# Patient Record
Sex: FEMALE | Race: WHITE | NOT HISPANIC OR LATINO | Employment: OTHER | URBAN - METROPOLITAN AREA
[De-identification: names, ages, dates, MRNs, and addresses within clinical notes are randomized per-mention and may not be internally consistent; named-entity substitution may affect disease eponyms.]

---

## 2017-01-04 ENCOUNTER — OFFICE VISIT - HEALTHEAST (OUTPATIENT)
Dept: FAMILY MEDICINE | Facility: CLINIC | Age: 67
End: 2017-01-04

## 2017-01-04 DIAGNOSIS — E78.5 HYPERLIPIDEMIA: ICD-10-CM

## 2017-01-04 DIAGNOSIS — E55.9 VITAMIN D DEFICIENCY: ICD-10-CM

## 2017-01-04 DIAGNOSIS — Z13.820 SCREENING FOR OSTEOPOROSIS: ICD-10-CM

## 2017-01-04 DIAGNOSIS — Z00.00 ROUTINE GENERAL MEDICAL EXAMINATION AT A HEALTH CARE FACILITY: ICD-10-CM

## 2017-01-04 LAB
CHOLEST SERPL-MCNC: 251 MG/DL
FASTING STATUS PATIENT QL REPORTED: YES
HDLC SERPL-MCNC: 57 MG/DL
LDLC SERPL CALC-MCNC: 177 MG/DL
TRIGL SERPL-MCNC: 83 MG/DL

## 2017-01-04 ASSESSMENT — MIFFLIN-ST. JEOR: SCORE: 1262.59

## 2017-01-06 ENCOUNTER — RECORDS - HEALTHEAST (OUTPATIENT)
Dept: ADMINISTRATIVE | Facility: OTHER | Age: 67
End: 2017-01-06

## 2017-01-06 ENCOUNTER — RECORDS - HEALTHEAST (OUTPATIENT)
Dept: BONE DENSITY | Facility: CLINIC | Age: 67
End: 2017-01-06

## 2017-01-06 DIAGNOSIS — Z13.820 ENCOUNTER FOR SCREENING FOR OSTEOPOROSIS: ICD-10-CM

## 2017-01-12 ENCOUNTER — COMMUNICATION - HEALTHEAST (OUTPATIENT)
Dept: FAMILY MEDICINE | Facility: CLINIC | Age: 67
End: 2017-01-12

## 2017-05-22 ENCOUNTER — RECORDS - HEALTHEAST (OUTPATIENT)
Dept: ADMINISTRATIVE | Facility: OTHER | Age: 67
End: 2017-05-22

## 2017-06-05 ENCOUNTER — RECORDS - HEALTHEAST (OUTPATIENT)
Dept: ADMINISTRATIVE | Facility: OTHER | Age: 67
End: 2017-06-05

## 2017-07-17 ENCOUNTER — RECORDS - HEALTHEAST (OUTPATIENT)
Dept: ADMINISTRATIVE | Facility: OTHER | Age: 67
End: 2017-07-17

## 2017-12-14 ENCOUNTER — HOSPITAL ENCOUNTER (OUTPATIENT)
Dept: MAMMOGRAPHY | Facility: HOSPITAL | Age: 67
Discharge: HOME | End: 2017-12-14
Attending: FAMILY MEDICINE

## 2017-12-14 DIAGNOSIS — Z12.31 VISIT FOR SCREENING MAMMOGRAM: ICD-10-CM

## 2018-01-31 ENCOUNTER — OFFICE VISIT - HEALTHEAST (OUTPATIENT)
Dept: FAMILY MEDICINE | Facility: CLINIC | Age: 68
End: 2018-01-31

## 2018-01-31 DIAGNOSIS — E55.9 VITAMIN D DEFICIENCY: ICD-10-CM

## 2018-01-31 DIAGNOSIS — M67.431 GANGLION CYST OF WRIST, RIGHT: ICD-10-CM

## 2018-01-31 DIAGNOSIS — E78.2 MIXED HYPERLIPIDEMIA: ICD-10-CM

## 2018-01-31 DIAGNOSIS — Z00.00 WELCOME TO MEDICARE PREVENTIVE VISIT: ICD-10-CM

## 2018-01-31 DIAGNOSIS — Z86.19 H/O COLD SORES: ICD-10-CM

## 2018-01-31 DIAGNOSIS — N31.8 HYPERTONICITY OF BLADDER: ICD-10-CM

## 2018-01-31 LAB
ALBUMIN SERPL-MCNC: 3.8 G/DL (ref 3.5–5)
ALP SERPL-CCNC: 66 U/L (ref 45–120)
ALT SERPL W P-5'-P-CCNC: 36 U/L (ref 0–45)
ANION GAP SERPL CALCULATED.3IONS-SCNC: 11 MMOL/L (ref 5–18)
AST SERPL W P-5'-P-CCNC: 21 U/L (ref 0–40)
BILIRUB SERPL-MCNC: 0.7 MG/DL (ref 0–1)
BUN SERPL-MCNC: 21 MG/DL (ref 8–22)
CALCIUM SERPL-MCNC: 9.2 MG/DL (ref 8.5–10.5)
CHLORIDE BLD-SCNC: 107 MMOL/L (ref 98–107)
CHOLEST SERPL-MCNC: 249 MG/DL
CO2 SERPL-SCNC: 23 MMOL/L (ref 22–31)
CREAT SERPL-MCNC: 0.76 MG/DL (ref 0.6–1.1)
FASTING STATUS PATIENT QL REPORTED: YES
GFR SERPL CREATININE-BSD FRML MDRD: >60 ML/MIN/1.73M2
GLUCOSE BLD-MCNC: 91 MG/DL (ref 70–125)
HDLC SERPL-MCNC: 64 MG/DL
LDLC SERPL CALC-MCNC: 163 MG/DL
POTASSIUM BLD-SCNC: 4.1 MMOL/L (ref 3.5–5)
PROT SERPL-MCNC: 6.9 G/DL (ref 6–8)
SODIUM SERPL-SCNC: 141 MMOL/L (ref 136–145)
TRIGL SERPL-MCNC: 109 MG/DL

## 2018-01-31 ASSESSMENT — MIFFLIN-ST. JEOR: SCORE: 1273.46

## 2018-02-01 LAB
25(OH)D3 SERPL-MCNC: 43.5 NG/ML (ref 30–80)
25(OH)D3 SERPL-MCNC: 43.5 NG/ML (ref 30–80)

## 2018-02-05 ENCOUNTER — RECORDS - HEALTHEAST (OUTPATIENT)
Dept: ADMINISTRATIVE | Facility: OTHER | Age: 68
End: 2018-02-05

## 2018-09-26 ENCOUNTER — RECORDS - HEALTHEAST (OUTPATIENT)
Dept: ADMINISTRATIVE | Facility: OTHER | Age: 68
End: 2018-09-26

## 2018-10-25 ENCOUNTER — RECORDS - HEALTHEAST (OUTPATIENT)
Dept: ADMINISTRATIVE | Facility: OTHER | Age: 68
End: 2018-10-25

## 2019-02-18 ENCOUNTER — HOSPITAL ENCOUNTER (OUTPATIENT)
Dept: MAMMOGRAPHY | Facility: CLINIC | Age: 69
Discharge: HOME | End: 2019-02-18
Attending: FAMILY MEDICINE

## 2019-02-18 DIAGNOSIS — Z12.31 VISIT FOR SCREENING MAMMOGRAM: ICD-10-CM

## 2019-03-22 ENCOUNTER — OFFICE VISIT - HEALTHEAST (OUTPATIENT)
Dept: FAMILY MEDICINE | Facility: CLINIC | Age: 69
End: 2019-03-22

## 2019-03-22 DIAGNOSIS — Z00.00 ROUTINE GENERAL MEDICAL EXAMINATION AT A HEALTH CARE FACILITY: ICD-10-CM

## 2019-03-22 DIAGNOSIS — N31.8 HYPERTONICITY OF BLADDER: ICD-10-CM

## 2019-03-22 DIAGNOSIS — Z86.39 HISTORY OF VITAMIN D DEFICIENCY: ICD-10-CM

## 2019-03-22 DIAGNOSIS — Z86.19 H/O COLD SORES: ICD-10-CM

## 2019-03-22 DIAGNOSIS — E78.2 MIXED HYPERLIPIDEMIA: ICD-10-CM

## 2019-03-22 LAB
ALBUMIN SERPL-MCNC: 4 G/DL (ref 3.5–5)
ALP SERPL-CCNC: 68 U/L (ref 45–120)
ALT SERPL W P-5'-P-CCNC: 19 U/L (ref 0–45)
ANION GAP SERPL CALCULATED.3IONS-SCNC: 7 MMOL/L (ref 5–18)
AST SERPL W P-5'-P-CCNC: 14 U/L (ref 0–40)
BILIRUB SERPL-MCNC: 0.5 MG/DL (ref 0–1)
BUN SERPL-MCNC: 18 MG/DL (ref 8–22)
CALCIUM SERPL-MCNC: 9.6 MG/DL (ref 8.5–10.5)
CHLORIDE BLD-SCNC: 106 MMOL/L (ref 98–107)
CHOLEST SERPL-MCNC: 243 MG/DL
CO2 SERPL-SCNC: 28 MMOL/L (ref 22–31)
CREAT SERPL-MCNC: 0.79 MG/DL (ref 0.6–1.1)
FASTING STATUS PATIENT QL REPORTED: YES
GFR SERPL CREATININE-BSD FRML MDRD: >60 ML/MIN/1.73M2
GLUCOSE BLD-MCNC: 85 MG/DL (ref 70–125)
HDLC SERPL-MCNC: 62 MG/DL
HGB BLD-MCNC: 15.2 G/DL (ref 12–16)
LDLC SERPL CALC-MCNC: 160 MG/DL
POTASSIUM BLD-SCNC: 4.2 MMOL/L (ref 3.5–5)
PROT SERPL-MCNC: 6.7 G/DL (ref 6–8)
SODIUM SERPL-SCNC: 141 MMOL/L (ref 136–145)
TRIGL SERPL-MCNC: 103 MG/DL

## 2019-03-22 ASSESSMENT — MIFFLIN-ST. JEOR: SCORE: 1263.54

## 2019-03-25 ENCOUNTER — COMMUNICATION - HEALTHEAST (OUTPATIENT)
Dept: FAMILY MEDICINE | Facility: CLINIC | Age: 69
End: 2019-03-25

## 2019-04-04 ENCOUNTER — RECORDS - HEALTHEAST (OUTPATIENT)
Dept: ADMINISTRATIVE | Facility: OTHER | Age: 69
End: 2019-04-04

## 2019-05-23 ENCOUNTER — RECORDS - HEALTHEAST (OUTPATIENT)
Dept: ADMINISTRATIVE | Facility: OTHER | Age: 69
End: 2019-05-23

## 2019-10-29 ENCOUNTER — COMMUNICATION - HEALTHEAST (OUTPATIENT)
Dept: FAMILY MEDICINE | Facility: CLINIC | Age: 69
End: 2019-10-29

## 2020-03-02 ENCOUNTER — HOSPITAL ENCOUNTER (OUTPATIENT)
Dept: MAMMOGRAPHY | Facility: CLINIC | Age: 70
Discharge: HOME | End: 2020-03-02
Attending: FAMILY MEDICINE

## 2020-03-02 DIAGNOSIS — Z12.31 VISIT FOR SCREENING MAMMOGRAM: ICD-10-CM

## 2020-03-04 ENCOUNTER — OFFICE VISIT - HEALTHEAST (OUTPATIENT)
Dept: FAMILY MEDICINE | Facility: CLINIC | Age: 70
End: 2020-03-04

## 2020-03-04 DIAGNOSIS — Z00.00 MEDICARE ANNUAL WELLNESS VISIT, SUBSEQUENT: ICD-10-CM

## 2020-03-04 DIAGNOSIS — L82.1 SEBORRHEIC KERATOSIS: ICD-10-CM

## 2020-03-04 DIAGNOSIS — E78.5 HYPERLIPIDEMIA: ICD-10-CM

## 2020-03-04 DIAGNOSIS — Z01.110 ENCOUNTER FOR HEARING EXAMINATION FOLLOWING FAILED HEARING SCREENING: ICD-10-CM

## 2020-03-04 DIAGNOSIS — E66.3 OVERWEIGHT: ICD-10-CM

## 2020-03-04 DIAGNOSIS — Z12.83 SKIN CANCER SCREENING: ICD-10-CM

## 2020-03-04 DIAGNOSIS — N39.3 FEMALE STRESS INCONTINENCE: ICD-10-CM

## 2020-03-04 DIAGNOSIS — Z86.19 H/O COLD SORES: ICD-10-CM

## 2020-03-04 LAB
ALBUMIN SERPL-MCNC: 4 G/DL (ref 3.5–5)
ALP SERPL-CCNC: 76 U/L (ref 45–120)
ALT SERPL W P-5'-P-CCNC: 22 U/L (ref 0–45)
ANION GAP SERPL CALCULATED.3IONS-SCNC: 10 MMOL/L (ref 5–18)
AST SERPL W P-5'-P-CCNC: 19 U/L (ref 0–40)
BILIRUB SERPL-MCNC: 0.8 MG/DL (ref 0–1)
BUN SERPL-MCNC: 17 MG/DL (ref 8–22)
CALCIUM SERPL-MCNC: 9.8 MG/DL (ref 8.5–10.5)
CHLORIDE BLD-SCNC: 105 MMOL/L (ref 98–107)
CHOLEST SERPL-MCNC: 230 MG/DL
CO2 SERPL-SCNC: 27 MMOL/L (ref 22–31)
CREAT SERPL-MCNC: 0.82 MG/DL (ref 0.6–1.1)
FASTING STATUS PATIENT QL REPORTED: YES
GFR SERPL CREATININE-BSD FRML MDRD: >60 ML/MIN/1.73M2
GLUCOSE BLD-MCNC: 86 MG/DL (ref 70–125)
HDLC SERPL-MCNC: 62 MG/DL
HGB BLD-MCNC: 14.5 G/DL (ref 12–16)
LDLC SERPL CALC-MCNC: 154 MG/DL
POTASSIUM BLD-SCNC: 4.2 MMOL/L (ref 3.5–5)
PROT SERPL-MCNC: 6.9 G/DL (ref 6–8)
SODIUM SERPL-SCNC: 142 MMOL/L (ref 136–145)
TRIGL SERPL-MCNC: 70 MG/DL

## 2020-03-04 ASSESSMENT — ANXIETY QUESTIONNAIRES
1. FEELING NERVOUS, ANXIOUS, OR ON EDGE: NOT AT ALL
2. NOT BEING ABLE TO STOP OR CONTROL WORRYING: SEVERAL DAYS

## 2020-03-04 ASSESSMENT — MIFFLIN-ST. JEOR: SCORE: 1249.08

## 2020-03-24 ENCOUNTER — COMMUNICATION - HEALTHEAST (OUTPATIENT)
Dept: AUDIOLOGY | Facility: CLINIC | Age: 70
End: 2020-03-24

## 2020-09-23 ENCOUNTER — RECORDS - HEALTHEAST (OUTPATIENT)
Dept: ADMINISTRATIVE | Facility: OTHER | Age: 70
End: 2020-09-23

## 2020-09-28 ENCOUNTER — RECORDS - HEALTHEAST (OUTPATIENT)
Dept: ADMINISTRATIVE | Facility: OTHER | Age: 70
End: 2020-09-28

## 2021-01-13 ENCOUNTER — COMMUNICATION - HEALTHEAST (OUTPATIENT)
Dept: FAMILY MEDICINE | Facility: CLINIC | Age: 71
End: 2021-01-13

## 2021-01-13 DIAGNOSIS — N31.8 HYPERTONICITY OF BLADDER: ICD-10-CM

## 2021-02-02 ENCOUNTER — RECORDS - HEALTHEAST (OUTPATIENT)
Dept: ADMINISTRATIVE | Facility: OTHER | Age: 71
End: 2021-02-02

## 2021-03-17 ENCOUNTER — HOSPITAL ENCOUNTER (OUTPATIENT)
Dept: MAMMOGRAPHY | Facility: CLINIC | Age: 71
Discharge: HOME | End: 2021-03-17
Attending: FAMILY MEDICINE

## 2021-03-17 DIAGNOSIS — Z12.31 VISIT FOR SCREENING MAMMOGRAM: ICD-10-CM

## 2021-05-10 ENCOUNTER — OFFICE VISIT - HEALTHEAST (OUTPATIENT)
Dept: FAMILY MEDICINE | Facility: CLINIC | Age: 71
End: 2021-05-10

## 2021-05-10 ENCOUNTER — COMMUNICATION - HEALTHEAST (OUTPATIENT)
Dept: FAMILY MEDICINE | Facility: CLINIC | Age: 71
End: 2021-05-10

## 2021-05-10 DIAGNOSIS — N31.8 HYPERTONICITY OF BLADDER: ICD-10-CM

## 2021-05-10 DIAGNOSIS — Z01.10 ENCOUNTER FOR HEARING EXAMINATION, UNSPECIFIED WHETHER ABNORMAL FINDINGS: ICD-10-CM

## 2021-05-10 DIAGNOSIS — Z86.19 H/O COLD SORES: ICD-10-CM

## 2021-05-10 DIAGNOSIS — E55.9 VITAMIN D DEFICIENCY, UNSPECIFIED: ICD-10-CM

## 2021-05-10 DIAGNOSIS — N39.0 ACUTE UTI: ICD-10-CM

## 2021-05-10 DIAGNOSIS — E78.2 MIXED HYPERLIPIDEMIA: ICD-10-CM

## 2021-05-10 DIAGNOSIS — Z12.83 SKIN CANCER SCREENING: ICD-10-CM

## 2021-05-10 DIAGNOSIS — N39.3 FEMALE STRESS INCONTINENCE: ICD-10-CM

## 2021-05-10 DIAGNOSIS — R39.9 UTI SYMPTOMS: ICD-10-CM

## 2021-05-10 DIAGNOSIS — Z00.00 MEDICARE ANNUAL WELLNESS VISIT, SUBSEQUENT: ICD-10-CM

## 2021-05-10 DIAGNOSIS — E66.3 OVERWEIGHT: ICD-10-CM

## 2021-05-10 LAB
ALBUMIN SERPL-MCNC: 4.1 G/DL (ref 3.5–5)
ALBUMIN UR-MCNC: ABNORMAL G/DL
ALP SERPL-CCNC: 75 U/L (ref 45–120)
ALT SERPL W P-5'-P-CCNC: 13 U/L (ref 0–45)
ANION GAP SERPL CALCULATED.3IONS-SCNC: 10 MMOL/L (ref 5–18)
APPEARANCE UR: ABNORMAL
AST SERPL W P-5'-P-CCNC: 13 U/L (ref 0–40)
BACTERIA #/AREA URNS HPF: ABNORMAL /[HPF]
BILIRUB SERPL-MCNC: 0.8 MG/DL (ref 0–1)
BILIRUB UR QL STRIP: ABNORMAL
BUN SERPL-MCNC: 16 MG/DL (ref 8–22)
CALCIUM SERPL-MCNC: 9 MG/DL (ref 8.5–10.5)
CHLORIDE BLD-SCNC: 104 MMOL/L (ref 98–107)
CHOLEST SERPL-MCNC: 246 MG/DL
CO2 SERPL-SCNC: 26 MMOL/L (ref 22–31)
COLOR UR AUTO: YELLOW
CREAT SERPL-MCNC: 0.84 MG/DL (ref 0.6–1.1)
FASTING STATUS PATIENT QL REPORTED: YES
GFR SERPL CREATININE-BSD FRML MDRD: >60 ML/MIN/1.73M2
GLUCOSE BLD-MCNC: 100 MG/DL (ref 70–125)
GLUCOSE UR STRIP-MCNC: NEGATIVE MG/DL
HDLC SERPL-MCNC: 59 MG/DL
HGB BLD-MCNC: 15.1 G/DL (ref 12–16)
HGB UR QL STRIP: ABNORMAL
KETONES UR STRIP-MCNC: NEGATIVE MG/DL
LDLC SERPL CALC-MCNC: 165 MG/DL
LEUKOCYTE ESTERASE UR QL STRIP: ABNORMAL
NITRATE UR QL: NEGATIVE
PH UR STRIP: 7.5 [PH] (ref 5–8)
POTASSIUM BLD-SCNC: 4.4 MMOL/L (ref 3.5–5)
PROT SERPL-MCNC: 7.1 G/DL (ref 6–8)
RBC #/AREA URNS AUTO: ABNORMAL HPF
SODIUM SERPL-SCNC: 140 MMOL/L (ref 136–145)
SP GR UR STRIP: 1.01 (ref 1–1.03)
SQUAMOUS #/AREA URNS AUTO: ABNORMAL LPF
TRIGL SERPL-MCNC: 108 MG/DL
TSH SERPL DL<=0.005 MIU/L-ACNC: 1.68 UIU/ML (ref 0.3–5)
UROBILINOGEN UR STRIP-ACNC: ABNORMAL
WBC #/AREA URNS AUTO: >100 HPF
WBC CLUMPS #/AREA URNS HPF: PRESENT /[HPF]

## 2021-05-10 RX ORDER — ACETAMINOPHEN 325 MG/1
650 TABLET ORAL EVERY 6 HOURS PRN
Status: SHIPPED | COMMUNITY
Start: 2021-05-10

## 2021-05-10 RX ORDER — VALACYCLOVIR HYDROCHLORIDE 500 MG/1
TABLET, FILM COATED ORAL
Qty: 20 TABLET | Refills: 12 | Status: SHIPPED | OUTPATIENT
Start: 2021-05-10 | End: 2022-05-08

## 2021-05-10 ASSESSMENT — MIFFLIN-ST. JEOR: SCORE: 1254.36

## 2021-05-11 LAB
25(OH)D3 SERPL-MCNC: 58.5 NG/ML (ref 30–80)
25(OH)D3 SERPL-MCNC: 58.5 NG/ML (ref 30–80)
BACTERIA SPEC CULT: NORMAL

## 2021-05-17 ENCOUNTER — RECORDS - HEALTHEAST (OUTPATIENT)
Dept: ADMINISTRATIVE | Facility: OTHER | Age: 71
End: 2021-05-17

## 2021-05-24 ENCOUNTER — RECORDS - HEALTHEAST (OUTPATIENT)
Dept: ADMINISTRATIVE | Facility: CLINIC | Age: 71
End: 2021-05-24

## 2021-05-25 ENCOUNTER — RECORDS - HEALTHEAST (OUTPATIENT)
Dept: ADMINISTRATIVE | Facility: CLINIC | Age: 71
End: 2021-05-25

## 2021-05-26 NOTE — PROGRESS NOTES
Assessment and Plan:       Neela was seen today for annual exam.    Routine general medical examination at a health care facility  -     Lipid Cascade FASTING  -     Hemoglobin  -Pneumovax and tetanus booster administered today.  Counseled on vaccine and side effects.  Discussed new shingles vaccine.  She will check with insurance on coverage.  Mammogram up-to-date.  Colonoscopy scheduled for early April.  Bone density scan due in 2022.  Continue regular exercise, healthy diet and adequate calcium and vitamin D intake.  Return in 1 year for next yearly physical.    H/O cold sores  -     valACYclovir (VALTREX) 500 MG tablet; 1000 mg (2 tabs) twice daily x 2 days with outbreak, then 500 mg (1 tab) twice daily for up to 6 more days  -Continue valacyclovir as needed.  Refill provided    Hyperactivity Of The Bladder  -     oxybutynin (DITROPAN) 5 MG tablet; Take 1 tablet (5 mg total) by mouth once daily.  -Continue oxybutynin.  Discussed Kegel exercises.  Avoid caffeine and alcohol as these can contribute to irritability of the bladder    Mixed hyperlipidemia  -     Comprehensive Metabolic Panel   -continue intensive lifestyle changes  -    History of vitamin D deficiency  Continue vitamin D supplement          The patient's current medical problems were reviewed.    The following high BMI interventions were performed this visit: encouragement to exercise  The following health maintenance schedule was reviewed with the patient and provided in printed form in the after visit summary:   Health Maintenance   Topic Date Due     ZOSTER VACCINES (2 of 3) 04/18/2016     PNEUMOCOCCAL POLYSACCHARIDE VACCINE AGE 65 AND OVER  05/31/2017     DXA SCAN  01/06/2019     FALL RISK ASSESSMENT  01/31/2019     TD 18+ HE  08/20/2019     MAMMOGRAM  02/18/2021     ADVANCE DIRECTIVES DISCUSSED WITH PATIENT  01/31/2023     COLONOSCOPY  04/01/2024     INFLUENZA VACCINE RULE BASED  Completed     PNEUMOCOCCAL CONJUGATE VACCINE FOR ADULTS (PCV13  OR PREVNAR)  Completed        Subjective:   Chief Complaint: Neela Bonner is an 68 y.o. female here for an Annual Wellness visit.   HPI: She has not been seen by her physical about 1 year ago.  Reviewed her health history.  No significant changes in her health.  Medications and allergies are reviewed.  Family and social history are reviewed and updated.  She had hysterectomy for benign uterine fibroids.  Still has ovaries.  Has a ganglion cyst on her right wrist.  Saw orthopedic specialist for this last year and had some fluid removed.  However the cyst has reoccurred and does occasionally cause her discomfort, primarily at night.  She is contemplating going back to an orthopedic specialist for this.  She does Pilates and aerobic exercise.  Was having some pain in her hip and had cortisone injections.  Still having some trouble with catching in the left hip and is thinking about returning to the orthopedic specialist for this as well.  She has lost 3 pounds since last visit.  She tries to follow healthy diet.  She has history of vitamin D deficiency and is on a vitamin D supplement.  Has history of hyperlipidemia that is not currently being treated with medications.  She has history of hyperactivity of the bladder.  Takes oxybutynin.  Has history of cold sores and uses valacyclovir as needed.  Gets regular vision and dental exams.  Up-to-date on mammogram.  Has colonoscopy scheduled for early April.  Review of systems assessed and otherwise negative.  No other concerns or questions.    Review of Systems:   Please see above.  The rest of the review of systems are negative for all systems.    Patient Care Team:  Joy Schreiber MD as PCP - General (Family Medicine)     Patient Active Problem List   Diagnosis     Menopause Has Occurred     H/O cold sores     Vitamin D Deficiency     Hyperlipidemia     Overweight     Hyperactivity Of The Bladder     Localized Primary Osteoarthritis Of The Left Hip     Patellar  Chondromalacia     Rotator cuff tear (left)     Benign Adenoma Of The Large Intestine     History of being hospitalized     Cardiac risk counseling     Past Medical History:   Diagnosis Date     Chickenpox 12 y/o     Dermatophytosis of nail 2008, 2009 2008. Recurrence 1/2009. Painful.  Treating with oral lamisil x3 months then recheck. Checked LFTs & Cr (wnl)     Esophageal reflux 8/15/2011    - 8/15/11: c/o chest discomfort, bloating sensation, +epigastric tender, rxd omeprazole 20mg XR/day - 10/2011: took PPI x1 mo, then stopped and symptoms have not returned     Fibrocystic breast      H/O exercise stress test 8/24/11    - 8/24/11 exercise Stress Test, [facility]: neg test, no EKG changes, no inducible ischemia, good exercise tolerance (105% of predicted)      Past Surgical History:   Procedure Laterality Date     COLONOSCOPY  3/10/04    - 3/10/04 [facility], screening colonoscpopy WNL, repeat 10 years     COLONOSCOPY W/ BIOPSIES  4/1/14    - 4/1/14 [facility], [facility], repeat screening colonoscopy: 2mm polyp ileocecal valve (path=hyperplastic polyp); 4mm polyp splenic flexure (path=sessile serrated adenoma, no overt dysplasia); 4mm polyp sigmoid colon (not removed due to pt discomfort); repeat 5 yrs using MAC & propofol     HYSTERECTOMY  1990's     NM REMOVE TONSILS/ADENOIDS,<11 Y/O  1958 (9 y/o)    T&A     NM REVISE MEDIAN N/CARPAL TUNNEL SURG Right 10/26/05    - 10/26/05 Dr Duarte, [facility]: RIGHT carpal tunnel release     NM REVISE MEDIAN N/CARPAL TUNNEL SURG Left 6/2007    - 6/2007 Dr Sullivan, [facility]: L carpal tunnel release     NM SLING OPER STRES INCONTINENCE  7/9/07    - 7/9/07 Dr Chin: TVT for stress incontinence     NM TOTAL ABDOM HYSTERECTOMY  5/1999    - 5/1999: ANNALEE for fibroids, benign, by Dr. Mitchell ([facility]). Cervix taken. Does NOT need paps (per Dionisio 2/2010)      Family History   Problem Relation Age of Onset     Anxiety disorder Daughter          anxiety on both sides of family; anxiety attacks; daughter had been on Effexor     Breast cancer Cousin         40s; maternal cousin     Heart attack Maternal Grandfather      Heart attack Maternal Aunt 75        Mat aunt: MI 74 y/o     Heart attack Maternal Uncle         several mat uncles     Nephrolithiasis Mother      Filipe Parkinson White syndrome Mother         controlled on atenolol     Nephrolithiasis Brother      Obesity Brother         brothers obese     Filipe Parkinson White syndrome Brother         - Brother: attempted ablation (unsuccessful), controlled on atenolol     Breast cancer Cousin         40s; maternal (bone mets)     Dementia Father      Heart disease Father       Social History     Socioeconomic History     Marital status:      Spouse name: Not on file     Number of children: Not on file     Years of education: Not on file     Highest education level: Not on file   Occupational History     Occupation: Homemaker     Comment: since 2007     Occupation: RETIRED from [employer]     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    Social Needs     Financial resource strain: Not on file     Food insecurity:     Worry: Not on file     Inability: Not on file     Transportation needs:     Medical: Not on file     Non-medical: Not on file   Tobacco Use     Smoking status: Never Smoker     Smokeless tobacco: Never Used   Substance and Sexual Activity     Alcohol use: Yes     Alcohol/week: 0.6 oz     Types: 1 Standard drinks or equivalent per week     Comment: social      Drug use: No     Sexual activity: Yes     Partners: Male     Birth control/protection: Post-menopausal, Surgical   Lifestyle     Physical activity:     Days per week: Not on file     Minutes per session: Not on file     Stress: Not on file   Relationships     Social connections:     Talks on phone: Not on file     Gets together: Not on file     Attends Lutheran service: Not on file     Active member of club or organization: Not  "on file     Attends meetings of clubs or organizations: Not on file     Relationship status: Not on file     Intimate partner violence:     Fear of current or ex partner: Not on file     Emotionally abused: Not on file     Physically abused: Not on file     Forced sexual activity: Not on file   Other Topics Concern     Not on file   Social History Narrative    Notes per PCP, Dr Nichole 12/21/2014:         HOME ENVIRONMENT:    - 12/2012: Lives with , no pets, split level house        MARITAL HISTORY:    -  to [place] \"[place] since 1976        EDUCATION:    - high school grad ([place])        HOBBIES/LEISURE ACTIVITIES:    - 10/2009: sewing, wants to get back into exercising, book club        PERSONAL HISTORY:    - From [place], [place], 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 Has 1 sister in [place]; still has family in [place]; parents still in [place]      Current Outpatient Medications   Medication Sig Dispense Refill     cholecalciferol, vitamin D3, (VITAMIN D3) 5,000 unit Tab Take by mouth daily.       ibuprofen (ADVIL,MOTRIN) 200 MG tablet Take 400 mg by mouth as needed for pain.       oxybutynin (DITROPAN) 5 MG tablet Take 1 tablet (5 mg total) by mouth once daily. 90 tablet 3     valACYclovir (VALTREX) 500 MG tablet 1000 mg (2 tabs) twice daily x 2 days with outbreak, then 500 mg (1 tab) twice daily for up to 6 more days 20 tablet 12     No current facility-administered medications for this visit.       Objective:   Vital Signs:   Visit Vitals  /64 (Patient Site: Right Arm, Patient Position: Sitting, Cuff Size: Adult Large)   Pulse (!) 59   Temp 97.8  F (36.6  C) (Oral)   Ht 5' 4.5\" (1.638 m)   Wt 164 lb 6 oz (74.6 kg)   SpO2 98%   BMI 27.78 kg/m         VisionScreening:  No exam data present     PHYSICAL EXAM  Physical Examination: General appearance - alert, well appearing, and in no distress  Mental status - alert, oriented to person, place, and time  Eyes - pupils equal and " reactive, extraocular eye movements intact  Ears - bilateral TM's and external ear canals normal  Nose - normal and patent, no erythema, discharge or polyps  Mouth - mucous membranes moist, pharynx normal without lesions  Neck - supple, no significant adenopathy  Chest - clear to auscultation, no wheezes, rales or rhonchi, symmetric air entry  Heart - normal rate, regular rhythm, normal S1, S2, no murmurs, rubs, clicks or gallops  Abdomen - soft, nontender, nondistended, no masses or organomegaly  Breasts - breasts appear normal, no suspicious masses, no skin or nipple changes or axillary nodes  Extremities - peripheral pulses normal, no pedal edema, no clubbing or cyanosis, ganglion cyst right wrist  Skin - normal coloration and turgor, no rashes, no suspicious skin lesions noted, seborrheic keratoses noted on back and abdomen      Assessment Results 3/22/2019   Activities of Daily Living No help needed   Instrumental Activities of Daily Living No help needed   Mini Cog Total Score 5   Some recent data might be hidden     A Mini-Cog score of 0-2 suggests the possibility of dementia, score of 3-5 suggests no dementia    Identified Health Risks:     Information on urinary incontinence and treatment options given to patient.  Information regarding advance directives (living vasquez), including where she can download the appropriate form, was provided to the patient via the AVS.

## 2021-05-27 VITALS
BODY MASS INDEX: 27.83 KG/M2 | SYSTOLIC BLOOD PRESSURE: 117 MMHG | DIASTOLIC BLOOD PRESSURE: 64 MMHG | HEIGHT: 64 IN | WEIGHT: 163 LBS | TEMPERATURE: 98 F | HEART RATE: 71 BPM | OXYGEN SATURATION: 96 %

## 2021-05-30 VITALS — WEIGHT: 163.06 LBS | BODY MASS INDEX: 27.17 KG/M2 | HEIGHT: 65 IN

## 2021-05-31 VITALS — HEIGHT: 64 IN | BODY MASS INDEX: 28.59 KG/M2 | WEIGHT: 167.44 LBS

## 2021-06-02 ENCOUNTER — RECORDS - HEALTHEAST (OUTPATIENT)
Dept: ADMINISTRATIVE | Facility: OTHER | Age: 71
End: 2021-06-02

## 2021-06-02 ENCOUNTER — RECORDS - HEALTHEAST (OUTPATIENT)
Dept: ADMINISTRATIVE | Facility: CLINIC | Age: 71
End: 2021-06-02

## 2021-06-02 VITALS — BODY MASS INDEX: 27.39 KG/M2 | WEIGHT: 164.38 LBS | HEIGHT: 65 IN

## 2021-06-04 VITALS
WEIGHT: 162.06 LBS | TEMPERATURE: 97.9 F | OXYGEN SATURATION: 98 % | HEART RATE: 61 BPM | HEIGHT: 64 IN | BODY MASS INDEX: 27.67 KG/M2 | SYSTOLIC BLOOD PRESSURE: 118 MMHG | DIASTOLIC BLOOD PRESSURE: 70 MMHG

## 2021-06-05 ENCOUNTER — HEALTH MAINTENANCE LETTER (OUTPATIENT)
Age: 71
End: 2021-06-05

## 2021-06-06 NOTE — PROGRESS NOTES
Assessment and Plan:       Neela was seen today for annual wellness visit.    Medicare annual wellness visit, subsequent  -     Lipid Cascade  -     Comprehensive Metabolic Panel  -     Hemoglobin  -She is up-to-date on vaccines.  Mammogram up-to-date.  Colonoscopy due in 2022.  Bone density scan also due in 2022.  Continue regular exercise, healthy diet and adequate calcium and vitamin D intake.  Check fasting lipids, hemoglobin and CMP.  Return in 1 year for next yearly physical    Hyperlipidemia  -     Lipid Cascade  - Continue intensive lifestyle changes and weight loss efforts.  She has set a goal weight loss of 10 pounds    H/O cold sores  -     valACYclovir (VALTREX) 500 MG tablet; 1000 mg (2 tabs) twice daily x 2 days with outbreak, then 500 mg (1 tab) twice daily for up to 6 more days  -Continue valacyclovir as needed.  Refill provided    Encounter for hearing examination following failed hearing screening  -     Ambulatory referral to Audiology    Female stress incontinence  -     Ambulatory referral to PT/OT    Skin cancer screening  -     Ambulatory referral to Dermatology    Overweight  Continue regular exercise and weight loss efforts    Seborrheic keratosis  Discussed that this is benign in nature.  Did offer treatment with liquid nitrogen but she declines.      The patient's current medical problems were reviewed.    I have had an Advance Directives discussion with the patient.  The following high BMI interventions were performed this visit: encouragement to exercise  The following health maintenance schedule was reviewed with the patient and provided in printed form in the after visit summary:   Health Maintenance   Topic Date Due     ZOSTER VACCINES (3 of 3) 09/13/2019     MEDICARE ANNUAL WELLNESS VISIT  03/04/2021     FALL RISK ASSESSMENT  03/04/2021     DXA SCAN  01/06/2022     MAMMOGRAM  03/02/2022     COLONOSCOPY  04/04/2022     ADVANCE CARE PLANNING  01/31/2023     TD 18+ HE  03/22/2029      HEPATITIS C SCREENING  Completed     PNEUMOCOCCAL IMMUNIZATION 65+ LOW/MEDIUM RISK  Completed     INFLUENZA VACCINE RULE BASED  Completed        Subjective:   Chief Complaint: Neela Bonner is an 69 y.o. female here for an Annual Wellness visit.   HPI: She has not been seen since her last physical about 1 year ago.  Reviewed her health history.  No significant changes in her health.  Recently returned from a [place] vacation with her family.  Did quite a bit of walking and she was pleased that she was not bothered by any hip problems.  She did see her orthopedic specialist last July and received a cortisone injection.  We reviewed and updated medications and allergies as well as family and social history.  She gets regular vision and dental exams.  She exercises by walking and doing Pilates.  Ganglion cyst on right wrist has been unchanged.  She did not follow-up with orthopedic specialist regarding this but is still considering that in the future.  She has lost another couple of pounds since her last physical.  She tries to follow a healthy diet and get adequate calcium intake.  She has history of vitamin D deficiency and remains on a vitamin D supplement.  She has history of hyperlipidemia that is not currently being treated with medications.  She has history of hyperactivity of the bladder as well as stress incontinence.  She takes oxybutynin as needed.  She feels that Pilates exercises has also helped a bit with her incontinence.  She has history of cold sores and uses valacyclovir as needed.  She is up-to-date on mammogram.  She had colonoscopy last April.  3-year follow-up recommended.  She has a spot on her left lower abdomen that she would like checked today.  Seems to have become more raised in appearance.  It is rough in texture but otherwise does not bother her.  She has several moles that she is also concerned about.  She has noticed some changes in her hearing and difficulty hearing in certain  situations.  -Review of systems is assessed and is otherwise negative.  No other concerns or questions today.    Review of Systems:   Please see above.  The rest of the review of systems are negative for all systems.    Patient Care Team:  Joy Schreiber MD as PCP - General (Family Medicine)  Joy Schreiber MD as Assigned PCP     Patient Active Problem List   Diagnosis     Menopause Has Occurred     H/O cold sores     Hyperlipidemia     Overweight     Hyperactivity Of The Bladder     Localized Primary Osteoarthritis Of The Left Hip     Patellar Chondromalacia     Benign Adenoma Of The Large Intestine     History of being hospitalized     Cardiac risk counseling     Past Medical History:   Diagnosis Date     Chickenpox 12 y/o     Dermatophytosis of nail 2008, 2009 2008. Recurrence 1/2009. Painful.  Treating with oral lamisil x3 months then recheck. Checked LFTs & Cr (wnl)     Esophageal reflux 8/15/2011    - 8/15/11: c/o chest discomfort, bloating sensation, +epigastric tender, rxd omeprazole 20mg XR/day - 10/2011: took PPI x1 mo, then stopped and symptoms have not returned     Fibrocystic breast      H/O exercise stress test 8/24/11    - 8/24/11 exercise Stress Test, [facility]: neg test, no EKG changes, no inducible ischemia, good exercise tolerance (105% of predicted)      Past Surgical History:   Procedure Laterality Date     COLONOSCOPY  3/10/04    - 3/10/04 [facility], screening colonoscpopy WNL, repeat 10 years     COLONOSCOPY W/ BIOPSIES  4/1/14    - 4/1/14 [facility], repeat screening colonoscopy: 2mm polyp ileocecal valve (path=hyperplastic polyp); 4mm polyp splenic flexure (path=sessile serrated adenoma, no overt dysplasia); 4mm polyp sigmoid colon (not removed due to pt discomfort); repeat 5 yrs using MAC & propofol     HYSTERECTOMY  1990's     PA REMOVE TONSILS/ADENOIDS,<13 Y/O  1958 (9 y/o)    T&A     [place] REVISE MEDIAN N/CARPAL TUNNEL SURG Right 10/26/05    - 10/26/05 Dr Duarte,  [facility]: RIGHT carpal tunnel release     [facility] REVISE MEDIAN N/CARPAL TUNNEL SURG Left 6/2007    - 6/2007 Dr Sullivan, [facility]: L carpal tunnel release     VA SLING OPER STRES INCONTINENCE  7/9/07    - 7/9/07 Dr Chin: TVT for stress incontinence     VA TOTAL ABDOM HYSTERECTOMY  5/1999    - 5/1999: ANNALEE for fibroids, benign, by Dr. Mitchell ([facility]). Cervix taken. Does NOT need paps (per Dionisio 2/2010)      Family History   Problem Relation Age of Onset     Anxiety disorder Daughter         anxiety on both sides of family; anxiety attacks; daughter had been on Effexor     Breast cancer Cousin         40s; maternal cousin     Heart attack Maternal Grandfather      Heart attack Maternal Aunt 75        Mat aunt: MI 74 y/o     Heart attack Maternal Uncle         several mat uncles     Nephrolithiasis Mother      Filipe Parkinson White syndrome Mother         controlled on atenolol     Nephrolithiasis Brother      Obesity Brother         brothers obese     Filipe Parkinson White syndrome Brother         - Brother: attempted ablation (unsuccessful), controlled on atenolol     Breast cancer Cousin         40s; maternal (bone mets)     Dementia Father      Heart disease Father       Social History     Socioeconomic History     Marital status:      Spouse name: Not on file     Number of children: Not on file     Years of education: Not on file     Highest education level: Not on file   Occupational History     Occupation: Homemaker     Comment: since 2007     Occupation: RETIRED from [employer]     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    Social Needs     Financial resource strain: Not on file     Food insecurity:     Worry: Not on file     Inability: Not on file     Transportation needs:     Medical: Not on file     Non-medical: Not on file   Tobacco Use     Smoking status: Never Smoker     Smokeless tobacco: Never Used   Substance and Sexual Activity     Alcohol use: Yes      "Alcohol/week: 1.0 standard drinks     Types: 1 Standard drinks or equivalent per week     Comment: social      Drug use: No     Sexual activity: Yes     Partners: Male     Birth control/protection: Post-menopausal, Surgical   Lifestyle     Physical activity:     Days per week: Not on file     Minutes per session: Not on file     Stress: Not on file   Relationships     Social connections:     Talks on phone: Not on file     Gets together: Not on file     Attends Jainism service: Not on file     Active member of club or organization: Not on file     Attends meetings of clubs or organizations: Not on file     Relationship status: Not on file     Intimate partner violence:     Fear of current or ex partner: Not on file     Emotionally abused: Not on file     Physically abused: Not on file     Forced sexual activity: Not on file   Other Topics Concern     Not on file   Social History Narrative    Notes per PCP, Dr Nichole 12/21/2014:         HOME ENVIRONMENT:    - 12/2012: Lives with , no pets, split level house        MARITAL HISTORY:    -  to [place] since 1976        EDUCATION:    - high school grad ([place])        HOBBIES/LEISURE ACTIVITIES:    - 10/2009: sewing, wants to get back into exercising, book club        PERSONAL HISTORY:    - From [place], [place], 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 Has 1 sister in [place]; still has family in [place]; parents still in [place]      Current Outpatient Medications   Medication Sig Dispense Refill     cholecalciferol, vitamin D3, (VITAMIN D3) 5,000 unit Tab Take by mouth daily.       ibuprofen (ADVIL,MOTRIN) 200 MG tablet Take 400 mg by mouth as needed for pain.       oxybutynin (DITROPAN) 5 MG tablet Take 1 tablet (5 mg total) by mouth once daily. 90 tablet 3     valACYclovir (VALTREX) 500 MG tablet 1000 mg (2 tabs) twice daily x 2 days with outbreak, then 500 mg (1 tab) twice daily for up to 6 more days 20 tablet 12     No current " "facility-administered medications for this visit.       Objective:   Vital Signs:   Visit Vitals  /70 (Patient Site: Left Arm, Patient Position: Sitting, Cuff Size: Adult Regular)   Pulse 61   Temp 97.9  F (36.6  C) (Oral)   Ht 5' 4.25\" (1.632 m)   Wt 162 lb 1 oz (73.5 kg)   SpO2 98%   BMI 27.60 kg/m         VisionScreening:  No exam data present     PHYSICAL EXAM  Physical Examination: General appearance - alert, well appearing, and in no distress  Mental status - alert, oriented to person, place, and time  Eyes - pupils equal and reactive, extraocular eye movements intact  Ears - bilateral TM's and external ear canals normal  Nose - normal and patent, no erythema, discharge or polyps  Mouth - mucous membranes moist, pharynx normal without lesions  Neck - supple, no significant adenopathy  Lymphatics - no palpable lymphadenopathy, no hepatosplenomegaly  Chest - clear to auscultation, no wheezes, rales or rhonchi, symmetric air entry  Heart - normal rate, regular rhythm, normal S1, S2, no murmurs, rubs, clicks or gallops  Abdomen - soft, nontender, nondistended, no masses or organomegaly  Breasts - breasts appear normal, no suspicious masses, no skin or nipple changes or axillary nodes  Neurological - alert, oriented, normal speech, no focal findings or movement disorder noted  Musculoskeletal - no joint tenderness, deformity or swelling  Extremities - peripheral pulses normal, no pedal edema, no clubbing or cyanosis  Skin - seborrheic keratosis present left lower abdomen      Assessment Results 3/4/2020   Activities of Daily Living No help needed   Instrumental Activities of Daily Living No help needed   Mini Cog Total Score 5   Some recent data might be hidden     A Mini-Cog score of 0-2 suggests the possibility of dementia, score of 3-5 suggests no dementia    Identified Health Risks:     The patient was provided with written information regarding signs of hearing loss.  Information regarding advance " directives (living vasquez), including where she can download the appropriate form, was provided to the patient via the AVS.

## 2021-06-07 NOTE — TELEPHONE ENCOUNTER
Spoke with patient to cancel appointment with audiology for hearing testing on 4-1-20, due to risks associated with COVID-19. Patient will be called to reschedule in the future. Please call [phone] with any questions.

## 2021-06-08 NOTE — PROGRESS NOTES
Assessment:        1. Routine general medical examination at a health care facility     2. Screening for osteoporosis  DXA Bone Density Scan   3. Hyperlipidemia  Lipid Cascade FASTING    Thyroid Stimulating Hormone (TSH)    Comprehensive Metabolic Panel   4. Vitamin D deficiency  Vitamin D, Total (25-Hydroxy)       Plan:      1. Healthcare maintenance: Mammogram and colonoscopy up-to-date.  Does not need Pap smears.  Vaccines up-to-date.  Encouraged regular exercise, healthy diet and adequate calcium and vitamin D intake.  We will check lipids and fasting glucose today.  2. Osteoporosis screening: She is due for DEXA scan.  Order placed.  3. Hyperlipidemia: History of elevated cholesterol levels but has not wanted to take medication for treatment.  Check lipid cascade today along with TSH and comprehensive metabolic panel.  Discussed that if LDL is still above 190, I would recommend starting a statin medication.  She was comfortable with that plan.  Discussed we can continue this for 6-9 months and then discontinue the medication and see if she is able to maintain the lower cholesterol with lifestyle changes.  4. Vitamin D deficiency: Check vitamin D level today.  She is currently taking 5000 IUs of vitamin D daily.          Subjective:      Neela Bonner is a 66 y.o. female who presents for an annual exam.  She was seen recently for concern about a ganglion cyst of the right wrist as well as right shoulder pain.  She reports that both of these issues are about the same.  She has started doing some exercises for the shoulder.  She is not wanting to pursue any further intervention at this point.  Wishes to give both the wrist and shoulder a little bit more time.  We again reviewed her health history including allergies, medications and family history.  No significant changes since her office visit 1 month ago to establish care.  She did have a mammogram in December and that was normal.  No longer needs Pap  smears as she had hysterectomy for benign uterine fibroids.  She still has both ovaries.  She'll be due for follow-up colonoscopy in 2019.  Vaccines are up-to-date.  She does exercise regularly.  Tries to eat a healthy diet and get calcium in her diet.  Review of systems is otherwise negative.    Healthy Habits:   Regular Exercise: Yes --treadmill, pilates  Sunscreen Use: Yes  Healthy Diet: Yes  Dental Visits Regularly: Yes  Seat Belt: Yes  Sexually active: Yes  Self Breast Exam Monthly:Yes  Hemoccults: No  Flex Sig: No  Colonoscopy: Yes  Lipid Profile: Yes  Glucose Screen: Yes  Prevention of Osteoporosis: Yes  Last Dexa:   Guns at Home:  N/A      Immunization History   Administered Date(s) Administered     Influenza, inj, historic 2007, 2008, 12/15/2009, 2010, 10/20/2011, 2013, 2014, 2016, 10/29/2016     Influenza, seasonal,quad inj 6-35 mos 10/13/2012     Td, historic 10/24/2005     Tdap 2009     ZOSTER 2016     Immunization status: up to date and documented.      Gynecologic History  No LMP recorded. Patient has had a hysterectomy.  Contraception: status post hysterectomy  Last mammogram: . Results were: normal      OB History    Para Term  AB SAB TAB Ectopic Multiple Living   2 2 2 0 0 0 0 0 0 2      # Outcome Date GA Lbr Javier/2nd Weight Sex Delivery Anes PTL Lv   2 Term      Vag-Spont   Y   1 Term      Vag-Spont   Y      Obstetric Comments       FT x2       Current Outpatient Prescriptions   Medication Sig Dispense Refill     cholecalciferol, vitamin D3, (VITAMIN D3) 5,000 unit Tab Take by mouth daily.       GLUC ALAN/CHONDRO ALAN A/VIT C/MN (GLUCOSAMINE 1500 COMPLEX ORAL) Take 1 tablet by mouth daily.       ibuprofen (ADVIL,MOTRIN) 200 MG tablet Take 400 mg by mouth as needed for pain.       oxybutynin (DITROPAN) 5 MG tablet Take 1 tablet (5 mg total) by mouth once daily. 90 tablet 3     valACYclovir (VALTREX) 500 MG tablet 1000 mg (2  tabs) twice daily x 2 days with outbreak, then 500 mg (1 tab) twice daily for up to 6 more days 20 tablet 12     No current facility-administered medications for this visit.      Past Medical History   Diagnosis Date     Chickenpox 12 y/o     Dermatophytosis of nail 2008, 2009 2008. Recurrence 1/2009. Painful.  Treating with oral lamisil x3 months then recheck. Checked LFTs & Cr (wnl)     Esophageal reflux 8/15/2011     - 8/15/11: c/o chest discomfort, bloating sensation, +epigastric tender, rxd omeprazole 20mg XR/day - 10/2011: took PPI x1 mo, then stopped and symptoms have not returned     Fibrocystic breast      H/O exercise stress test 8/24/11     - 8/24/11 exercise Stress Test, [facility] [place]: neg test, no EKG changes, no inducible ischemia, good exercise tolerance (105% of predicted)     Past Surgical History   Procedure Laterality Date     Pr remove tonsils/adenoids,<13 y/o  1958 (7 y/o)     T&A     Pr total abdom hysterectomy  5/1999     - 5/1999: ANNALEE for fibroids, benign, by Dr. Mitchell ([facility]). Cervix taken. Does NOT need paps (per Dionisio 2/2010)     Colonoscopy  3/10/04     - 3/10/04 [facility], screening colonoscpopy WNL, repeat 10 years     Pr revise median n/carpal tunnel surg Right 10/26/05     - 10/26/05 Dr Duarte, [facility]: RIGHT carpal tunnel release     Pr revise median n/carpal tunnel surg Left 6/2007     - 6/2007 Dr Sullivan, [facility]: L carpal tunnel release     Pr sling oper stres incontinence  7/9/07     - 7/9/07 Dr Chin: TVT for stress incontinence     Colonoscopy w/ biopsies  4/1/14     - 4/1/14 [facility], repeat screening colonoscopy: 2mm polyp ileocecal valve (path=hyperplastic polyp); 4mm polyp splenic flexure (path=sessile serrated adenoma, no overt dysplasia); 4mm polyp sigmoid colon (not removed due to pt discomfort); repeat 5 yrs using MAC & propofol     Hysterectomy  1990's     Erythromycin base  Family History   Problem  "Relation Age of Onset     Anxiety disorder Daughter      anxiety on both sides of family; anxiety attacks; daughter had been on Effexor     Breast cancer Cousin      40s; maternal cousin     Heart attack Maternal Grandfather      Heart attack Maternal Aunt 75     Mat aunt: MI 74 y/o     Heart attack Maternal Uncle      several mat uncles     Nephrolithiasis Mother      Filipe Parkinson White syndrome Mother      controlled on atenolol     Nephrolithiasis Brother      Obesity Brother      brothers obese     Filipe Parkinson White syndrome Brother      - Brother: attempted ablation (unsuccessful), controlled on atenolol     Breast cancer Cousin      40s; maternal (bone mets)     Dementia Father      Heart disease Father      Social History     Social History     Marital status:      Spouse name: N/A     Number of children: N/A     Years of education: N/A     Occupational History     Homemaker      since 2007     RETIRED from       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      Social History Main Topics     Smoking status: Never Smoker     Smokeless tobacco: Never Used     Alcohol use 0.6 oz/week     1 Standard drinks or equivalent per week     Drug use: No     Sexual activity: Yes     Partners: Male     Birth control/ protection: Post-menopausal, Surgical     Other Topics Concern     Not on file     Social History Narrative    Notes per PCP, Dr Nichole 12/21/2014:         HOME ENVIRONMENT:    - 12/2012: Lives with , no pets, split level house        MARITAL HISTORY:    -  to [place] [facility] since 1976        EDUCATION:    - high school grad ([place])        HOBBIES/LEISURE ACTIVITIES:    - 10/2009: sewing, wants to get back into exercising, book club        PERSONAL HISTORY:    - From [place], [place], 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 Has 1 sister in [place]; still has family in [place]; parents still in [place]       Review of Systems  General:  Denies problem  Eyes: Denies " "problem  Ears/Nose/Throat: Denies problem  Cardiovascular: Denies problem  Respiratory:  Denies problem  Gastrointestinal:  Denies problem, Genitourinary: Denies problem  Musculoskeletal:  Denies problem  Skin: Denies problem  Neurologic: Denies problem  Psychiatric: Denies problem  Endocrine: Denies problem  Heme/Lymphatic: Denies problem   Allergic/Immunologic: Denies problem        Objective:         Visit Vitals     /66 (Patient Site: Left Arm, Patient Position: Sitting, Cuff Size: Adult Large)     Pulse 75     Temp 96.9  F (36.1  C) (Tympanic)     Ht 5' 4.5\" (1.638 m)     Wt 163 lb 1 oz (74 kg)     SpO2 95%     BMI 27.56 kg/m2         Physical Exam:  General Appearance: Alert, cooperative, no distress, appears stated age  Head: Normocephalic, without obvious abnormality, atraumatic  Eyes: PERRL, conjunctiva/corneas clear, EOM's intact  Ears: Normal TM's and external ear canals, both ears  Nose: Nares normal, septum midline,mucosa normal, no drainage  Throat: Lips, mucosa, and tongue normal; teeth and gums normal  Neck: Supple, symmetrical, trachea midline, no adenopathy;  thyroid: not enlarged, symmetric, no tenderness/mass/nodules  Back: Symmetric, no curvature, ROM normal  Lungs: Clear to auscultation bilaterally, respirations unlabored  Breasts: No breast masses, tenderness, asymmetry, or nipple discharge.  Heart: Regular rate and rhythm, S1 and S2 normal, no murmur, rub, or gallop, Abdomen: Soft, non-tender, bowel sounds active all four quadrants,  no masses, no organomegaly  Pelvic:Normally developed genitalia with no external lesions or eruptions. Vagina and cervix show no lesions, inflammation, discharge or tenderness. No cystocele, No rectocele. Uterus absent.  No adnexal mass or tenderness.  Extremities: Extremities normal, atraumatic, no cyanosis or edema  Skin: Skin color, texture, turgor normal, no rashes or lesions  Neurologic: Normal        "

## 2021-06-14 NOTE — TELEPHONE ENCOUNTER
RN cannot approve Refill Request    RN can NOT refill this medication med is not covered by policy/route to provider. Last office visit: 12/7/2016 Joy Schreiber MD Last Physical: 3/4/2020 Last MTM visit: Visit date not found Last visit same specialty: 12/7/2016 Joy Schreiber MD.  Next visit within 3 mo: Visit date not found  Next physical within 3 mo: Visit date not found      Clarice Daily, [facility] Triage/Med Refill 1/13/2021    Requested Prescriptions   Pending Prescriptions Disp Refills     oxybutynin (DITROPAN) 5 MG tablet [Pharmacy Med Name: OXYBUTYNIN 5 MG TABLET] 90 tablet 3     Sig: TAKE 1 TABLET BY MOUTH EVERY DAY       There is no refill protocol information for this order

## 2021-06-15 NOTE — PROGRESS NOTES
Assessment and Plan:       Neela was seen today for welcome to medicare visit.    Welcome to Medicare preventive visit  -     Vision Screening  -     Lipid Cascade  -Check fasting glucose today.  -Prevnar 13 administered.  Counseled on vaccine and side effects.  -Up-to-date on mammogram, colonoscopy and bone density scan  -Advanced directive discussion done today.  She was given copy of honoring choices for herself and her     H/O cold sores  -     valACYclovir (VALTREX) 500 MG tablet; 1000 mg (2 tabs) twice daily x 2 days with outbreak, then 500 mg (1 tab) twice daily for up to 6 more days  -Continue valacyclovir as needed.  Refill provided.    Hyperactivity Of The Bladder  -     oxybutynin (DITROPAN) 5 MG tablet; Take 1 tablet (5 mg total) by mouth once daily.  -Continue oxybutynin    Mixed hyperlipidemia  -     Comprehensive Metabolic Panel  -Continue efforts at weight loss, healthy diet and regular exercise    Vitamin D deficiency  -     Vitamin D, Total (25-Hydroxy)  -Continue vitamin D supplement.  Currently taking 5000 IUs daily    Ganglion cyst of wrist, right  -     Ambulatory referral to Orthopedic Surgery  -Referral placed to orthopedic specialist    Other orders  -     Pneumococcal conjugate vaccine 13-valent 6wks-17yrs; >50yrs        The patient's current medical problems were reviewed.    I have had an Advance Directives discussion with the patient.  The following health maintenance schedule was reviewed with the patient and provided in printed form in the after visit summary:   Health Maintenance   Topic Date Due     PNEUMOCOCCAL POLYSACCHARIDE VACCINE AGE 65 AND OVER  05/31/2017     INFLUENZA VACCINE RULE BASED (1) 08/01/2017     DXA SCAN  01/06/2019     FALL RISK ASSESSMENT  01/31/2019     TD 18+ HE  08/20/2019     MAMMOGRAM  12/14/2019     ADVANCE DIRECTIVES DISCUSSED WITH PATIENT  02/22/2021     COLONOSCOPY  04/01/2024     TDAP ADULT ONE TIME DOSE  Completed     PNEUMOCOCCAL CONJUGATE  VACCINE FOR ADULTS (PCV13 OR PREVNAR)  Completed     ZOSTER VACCINE  Completed        Subjective:   Chief Complaint: Neela Bonner is an 67 y.o. female here for a Welcome to Medicare visit.   HPI: She has not been seen since her physical about 1 year ago.  Health history is reviewed.  Medications and allergies are reviewed.  Family and social history is reviewed and updated.  She had hysterectomy for benign uterine fibroids and does not need Pap smears.  Still has both ovaries.  She continues to have concern about a bump on her right wrist that we have previously evaluated for her.  This has been thought to be a ganglion cyst.  She reports that it seems to be getting a little bit bigger and is bothering her more and she is interested in seeing an orthopedic specialist for this.  She does yoga and Pilates.  She was having some pain in both of her shoulders but that is better following physical therapy and cortisone injections.  She also had a right knee injury last summer and had a cortisone injection from the orthopedic specialist.  However she has not gotten back to a regular weightbearing exercise regimen since this injury and she has gained a few pounds.  She has noticed this as her close feel a little bit tighter.  She tries to eat healthy.  Does not get a lot of milk or other foods that are high in calcium in her diet.  She has history of vitamin D deficiency and is on a vitamin D supplement daily.  She has history of hyperlipidemia that is not currently being treated with lipid-lowering medications.  She also has history of hyperactivity of the bladder.  Takes oxybutynin.  She also has history of cold sores and uses valacyclovir as needed for outbreaks.  Gets regular vision and dental exams.  No other concerns or questions.  Review of systems is otherwise negative.    Review of Systems:   Please see above.  The rest of the review of systems are negative for all systems.    Patient Care Team:  Joy Schreiber  MD as PCP - General (Family Medicine)     Patient Active Problem List   Diagnosis     Menopause Has Occurred     H/O cold sores     Vitamin D Deficiency     Hyperlipidemia     Overweight     Hyperactivity Of The Bladder     Localized Primary Osteoarthritis Of The Left Hip     Patellar Chondromalacia     Rotator cuff tear (left)     Benign Adenoma Of The Large Intestine     History of being hospitalized     Cardiac risk counseling     Past Medical History:   Diagnosis Date     Chickenpox 12 y/o     Dermatophytosis of nail 2008, 2009 2008. Recurrence 1/2009. Painful.  Treating with oral lamisil x3 months then recheck. Checked LFTs & Cr (wnl)     Esophageal reflux 8/15/2011    - 8/15/11: c/o chest discomfort, bloating sensation, +epigastric tender, rxd omeprazole 20mg XR/day - 10/2011: took PPI x1 mo, then stopped and symptoms have not returned     Fibrocystic breast      H/O exercise stress test 8/24/11    - 8/24/11 exercise Stress Test, [facility]: neg test, no EKG changes, no inducible ischemia, good exercise tolerance (105% of predicted)      Past Surgical History:   Procedure Laterality Date     COLONOSCOPY  3/10/04    - 3/10/04 [facility], screening colonoscpopy WNL, repeat 10 years     COLONOSCOPY W/ BIOPSIES  4/1/14    - 4/1/14 [facility], repeat screening colonoscopy: 2mm polyp ileocecal valve (path=hyperplastic polyp); 4mm polyp splenic flexure (path=sessile serrated adenoma, no overt dysplasia); 4mm polyp sigmoid colon (not removed due to pt discomfort); repeat 5 yrs using MAC & propofol     HYSTERECTOMY  1990's     NM REMOVE TONSILS/ADENOIDS,<13 Y/O  1958 (7 y/o)    T&A     NM REVISE MEDIAN N/CARPAL TUNNEL SURG Right 10/26/05    - 10/26/05 Dr Duarte, [facility]: RIGHT carpal tunnel release     NM REVISE MEDIAN N/CARPAL TUNNEL SURG Left 6/2007    - 6/2007 Dr Sullivan, [facility]: L carpal tunnel release     NM SLING OPER STRES INCONTINENCE  7/9/07    - 7/9/07   "Elsy: TVT for stress incontinence     [place] TOTAL ABDOM HYSTERECTOMY  5/1999    - 5/1999: ANNALEE for fibroids, benign, by Dr. Mitchell ([facility]). Cervix taken. Does NOT need paps (per Dionisio 2/2010)      Family History   Problem Relation Age of Onset     Anxiety disorder Daughter      anxiety on both sides of family; anxiety attacks; daughter had been on Effexor     Breast cancer Cousin      40s; maternal cousin     Heart attack Maternal Grandfather      Heart attack Maternal Aunt 75     Mat aunt: MI 74 y/o     Heart attack Maternal Uncle      several mat uncles     Nephrolithiasis Mother      Filipe Parkinson White syndrome Mother      controlled on atenolol     Nephrolithiasis Brother      Obesity Brother      brothers obese     Filipe Parkinson White syndrome Brother      - Brother: attempted ablation (unsuccessful), controlled on atenolol     Breast cancer Cousin      40s; maternal (bone mets)     Dementia Father      Heart disease Father       Social History     Social History     Marital status:      Spouse name: N/A     Number of children: N/A     Years of education: N/A     Occupational History     Homemaker      since 2007     RETIRED from [employer]      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      Social History Main Topics     Smoking status: Never Smoker     Smokeless tobacco: Never Used     Alcohol use 0.6 oz/week     1 Standard drinks or equivalent per week     Drug use: No     Sexual activity: Yes     Partners: Male     Birth control/ protection: Post-menopausal, Surgical     Other Topics Concern     Not on file     Social History Narrative    Notes per PCP, Dr Nichole 12/21/2014:         HOME ENVIRONMENT:    - 12/2012: Lives with , no pets, split level house        MARITAL HISTORY:    -  to [place] [facility] since 1976        EDUCATION:    - high school grad ([place])        HOBBIES/LEISURE ACTIVITIES:    - 10/2009: sewing, wants to get back into exercising, book club        PERSONAL " "HISTORY:    - From [place], [place], 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 Has 1 sister in [place]; still has family in [place]; parents still in [place]       Current Outpatient Prescriptions   Medication Sig Dispense Refill     cholecalciferol, vitamin D3, (VITAMIN D3) 5,000 unit Tab Take by mouth daily.       ibuprofen (ADVIL,MOTRIN) 200 MG tablet Take 400 mg by mouth as needed for pain.       oxybutynin (DITROPAN) 5 MG tablet Take 1 tablet (5 mg total) by mouth once daily. 90 tablet 3     valACYclovir (VALTREX) 500 MG tablet 1000 mg (2 tabs) twice daily x 2 days with outbreak, then 500 mg (1 tab) twice daily for up to 6 more days 20 tablet 12     No current facility-administered medications for this visit.       Objective:   Vital Signs:   Visit Vitals     /74 (Patient Site: Right Arm, Patient Position: Sitting, Cuff Size: Adult Regular)     Pulse 79     Temp 97.5  F (36.4  C) (Tympanic)     Ht 5' 4.25\" (1.632 m)     Wt 167 lb 7 oz (75.9 kg)     SpO2 96%     BMI 28.52 kg/m2        EKG:  Not performed    VisionScreening:   Visual Acuity Screening    Right eye Left eye Both eyes   Without correction:      With correction: 20/20 20/20 20/20        PHYSICAL EXAM  Physical Examination: General appearance - alert, well appearing, and in no distress  Mental status - alert, oriented to person, place, and time  Eyes - pupils equal and reactive, extraocular eye movements intact  Ears - bilateral TM's and external ear canals normal  Nose - normal and patent, no erythema, discharge or polyps  Mouth - mucous membranes moist, pharynx normal without lesions  Neck - supple, no significant adenopathy  Lymphatics - no palpable lymphadenopathy, no hepatosplenomegaly  Chest - clear to auscultation, no wheezes, rales or rhonchi, symmetric air entry  Heart - normal rate, regular rhythm, normal S1, S2, no murmurs, rubs, clicks or gallops  Abdomen - soft, nontender, nondistended, no masses or " organomegaly  Breasts - breasts appear normal, no suspicious masses, no skin or nipple changes or axillary nodes  Pelvic - examination not indicated  Neurological - alert, oriented, normal speech, no focal findings or movement disorder noted  Musculoskeletal - ganglion cyst present dorsum of right hand/wrist  Extremities - peripheral pulses normal, no pedal edema, no clubbing or cyanosis  Skin - normal coloration and turgor, no rashes, no suspicious skin lesions noted      Assessment Results 1/31/2018   Activities of Daily Living No help needed   Instrumental Activities of Daily Living No help needed   Some recent data might be hidden     A Mini Cog score of 0-2 suggests the possibility of dementia, score of 3-5 suggests no dementia    Identified Health Risks:     The patient reports that she drinks more than one alcoholic drink per day but denies binge or excessive drinking. She was counseled and given information about possible harmful effects of excessive alcohol intake.  Information regarding advance directives (living [place]), including where she can download the appropriate form, was provided to the patient via the AVS.

## 2021-06-17 NOTE — PROGRESS NOTES
Assessment and Plan:     Patient has been advised of split billing requirements and indicates understanding: Yes  Neela was seen today for annual exam.    Medicare annual wellness visit, subsequent  -     Hemoglobin  -She is up-to-date on vaccines.  Mammogram up-to-date.  Colonoscopy due in 2022.  Bone density scan also due in 2022.  Continue with regular exercise, healthy diet and adequate calcium and vitamin D intake.  We will check fasting lipids, hemoglobin and CMP.  Return to clinic in 1 year for next physical    Hyperlipidemia  -     Lipid Cascade  -     Comprehensive Metabolic Panel  -     Thyroid Stimulating Hormone (TSH)-continue with healthy diet, regular exercise and weight loss efforts    H/O cold sores  -     valACYclovir (VALTREX) 500 MG tablet; 1000 mg (2 tabs) twice daily x 2 days with outbreak, then 500 mg (1 tab) twice daily for up to 6 more days  -Continue valacyclovir as needed    Hyperactivity Of The Bladder  Continue oxybutynin    Overweight  -     Thyroid Stimulating Hormone (TSH)  -Check TSH and lipids.  Encouraged her to continue with regular exercise and healthy diet and weight loss efforts    UTI symptoms  -     Urinalysis-UC if Indicated    Skin cancer screening  -     Ambulatory referral to Dermatology - [facility] [place]    Female stress incontinence  -     Ambulatory referral to PT/OT    Encounter for hearing examination, unspecified whether abnormal findings  -     Ambulatory referral to Audiology - [place]    Vitamin D deficiency, unspecified   -     Vitamin D, Total (25-Hydroxy)    Acute UTI  Urine sample is abnormal and suggests that a UTI is present.  Urine culture sent.  Prescription for Bactrim DS sent to pharmacy.    The patient's current medical problems were reviewed.    I have had an Advance Directives discussion with the patient.  The following high BMI interventions were performed this visit: encouragement to exercise  The following health maintenance  schedule was reviewed with the patient and provided in printed form in the after visit summary:   Health Maintenance   Topic Date Due     DEXA SCAN  01/06/2022     MEDICARE ANNUAL WELLNESS VISIT  05/10/2022     FALL RISK ASSESSMENT  05/10/2022     MAMMOGRAM  03/17/2023     LIPID  03/04/2025     ADVANCE CARE PLANNING  05/10/2026     TD 18+ HE  03/22/2029     COLORECTAL CANCER SCREENING  04/04/2029     HEPATITIS C SCREENING  Completed     Pneumococcal Vaccine: 65+ Years  Completed     INFLUENZA VACCINE RULE BASED  Completed     ZOSTER VACCINES  Completed     COVID-19 Vaccine  Completed     Pneumococcal Vaccine: Pediatrics (0 to 5 Years) and At-Risk Patients (6 to 64 Years)  Aged Out     HEPATITIS B VACCINES  Aged Out       Subjective:   Chief Complaint: Neela Bonner is an 70 y.o. female here for an Annual Wellness visit.   HPI: We reviewed her health history.  No significant changes in her health since she was seen in March 2020 for her last physical.  We reviewed and updated medications and allergies as well as family and social history.  She gets regular vision and dental exams.  She exercises by walking and doing Pilates.  She has history of osteoarthritis of the left hip.  She is seeing her orthopedic specialist on Thursday of this week as it has been giving her more trouble.  She is also bothered by both of her knees.  She has seen Ortho for this as well and has osteoarthritis.  She tries to follow healthy diet and get adequate calcium and vitamin D intake.  She has history of vitamin D deficiency and remains on a vitamin D supplement.  She has history of hyperlipidemia that is not currently being treated with medications.  She has history of hyperactivity of the bladder as well as stress incontinence.  Takes oxybutynin.  She has history of cold sores and uses valacyclovir as needed.  She is up-to-date on mammogram.  She had colonoscopy in April 2019.  3-year follow-up recommended.  She did have concerns about  her hearing that we discussed at her last physical.  Referral was placed to audiology for further evaluation but appointment was canceled due to Covid.  She would like to get this rescheduled.  She did not get to see dermatology for skin cancer screening due to Covid and she would like to get this rescheduled as well.  She was also referred for physical therapy for treatment of stress incontinence of the bladder but did not do this either due to Covid and she would like a new referral for this as well.  On review of systems she reports concerns about a possible urinary tract infection.  States that last evening she has been experiencing urinary frequency and decreased urine output.  She feels some pressure in the suprapubic region.  No dysuria.  No hematuria.  No fevers or chills.  She has history of bladder infections and this feels similar to what she has experienced in the past with UTIs.  Review of systems is assessed and is otherwise negative.  No other questions today.    Review of Systems:    Please see above.  The rest of the review of systems are negative for all systems.    Patient Care Team:  Joy Schreiber MD as PCP - General (Family Medicine)  Joy Schreiber MD as Assigned PCP     Patient Active Problem List   Diagnosis     Menopause Has Occurred     H/O cold sores     Hyperlipidemia     Overweight     Hyperactivity Of The Bladder     Localized Primary Osteoarthritis Of The Left Hip     Patellar Chondromalacia     Benign Adenoma Of The Large Intestine     History of being hospitalized     Cardiac risk counseling     Past Medical History:   Diagnosis Date     Chickenpox 12 y/o     Dermatophytosis of nail 2008, 2009 2008. Recurrence 1/2009. Painful.  Treating with oral lamisil x3 months then recheck. Checked LFTs & Cr (wnl)     Esophageal reflux 8/15/2011    - 8/15/11: c/o chest discomfort, bloating sensation, +epigastric tender, rxd omeprazole 20mg XR/day - 10/2011: took PPI x1 mo, then stopped and  symptoms have not returned     Fibrocystic breast      H/O exercise stress test 8/24/11    - 8/24/11 exercise Stress Test,  [place]: neg test, no EKG changes, no inducible ischemia, good exercise tolerance (105% of predicted)      Past Surgical History:   Procedure Laterality Date     COLONOSCOPY  3/10/04    - 3/10/04 [facility], screening colonoscpopy WNL, repeat 10 years     COLONOSCOPY W/ BIOPSIES  4/1/14    - 4/1/14 [facility], [facility], repeat screening colonoscopy: 2mm polyp ileocecal valve (path=hyperplastic polyp); 4mm polyp splenic flexure (path=sessile serrated adenoma, no overt dysplasia); 4mm polyp sigmoid colon (not removed due to pt discomfort); repeat 5 yrs using MAC & propofol     HYSTERECTOMY  1990's     MD REMOVE TONSILS/ADENOIDS,<11 Y/O  1958 (9 y/o)    T&A     MD REVISE MEDIAN N/CARPAL TUNNEL SURG Right 10/26/05    - 10/26/05 Dr Duarte, [facility]: RIGHT carpal tunnel release     MD REVISE MEDIAN N/CARPAL TUNNEL SURG Left 6/2007    - 6/2007 Dr Sullivan, [facility]: L carpal tunnel release     MD SLING OPER STRES INCONTINENCE  7/9/07    - 7/9/07 Dr Chin: [facility] for stress incontinence     MD TOTAL ABDOM HYSTERECTOMY  5/1999    - 5/1999: ANNALEE for fibroids, benign, by Dr. Mitchell ([facility]). Cervix taken. Does NOT need paps (per Dionisio 2/2010)      Family History   Problem Relation Age of Onset     Anxiety disorder Daughter         anxiety on both sides of family; anxiety attacks; daughter had been on Effexor     Breast cancer Cousin         40s; maternal cousin     Heart attack Maternal Grandfather      Heart attack Maternal Aunt 75        Mat aunt: MI 74 y/o     Heart attack Maternal Uncle         several mat uncles     Nephrolithiasis Mother      Filipe Parkinson White syndrome Mother         controlled on atenolol     Nephrolithiasis Brother      Obesity Brother         brothers obese     Filipe Parkinson White syndrome Brother         - Brother: attempted  ablation (unsuccessful), controlled on atenolol     Breast cancer Cousin         40s; maternal (bone mets)     Dementia Father      Heart disease Father       Social History     Socioeconomic History     Marital status:      Spouse name: Not on file     Number of children: Not on file     Years of education: Not on file     Highest education level: Not on file   Occupational History     Occupation: Homemaker     Comment: since 2007     Occupation: RETIRED from      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    Social Needs     Financial resource strain: Not on file     Food insecurity     Worry: Not on file     Inability: Not on file     Transportation needs     Medical: Not on file     Non-medical: Not on file   Tobacco Use     Smoking status: Never Smoker     Smokeless tobacco: Never Used   Substance and Sexual Activity     Alcohol use: Yes     Alcohol/week: 1.0 standard drinks     Types: 1 Standard drinks or equivalent per week     Comment: social      Drug use: No     Sexual activity: Yes     Partners: Male     Birth control/protection: Post-menopausal, Surgical   Lifestyle     Physical activity     Days per week: Not on file     Minutes per session: Not on file     Stress: Not on file   Relationships     Social connections     Talks on phone: Not on file     Gets together: Not on file     Attends Episcopal service: Not on file     Active member of club or organization: Not on file     Attends meetings of clubs or organizations: Not on file     Relationship status: Not on file     Intimate partner violence     Fear of current or ex partner: Not on file     Emotionally abused: Not on file     Physically abused: Not on file     Forced sexual activity: Not on file   Other Topics Concern     Not on file   Social History Narrative    Notes per PCP, Dr Nichole 12/21/2014:         HOME ENVIRONMENT:    - 12/2012: Lives with , no pets, split level house        MARITAL HISTORY:    -  to  "[place] \"[place]\" since 1976        EDUCATION:    - high school grad ([place])        HOBBIES/LEISURE ACTIVITIES:    - 10/2009: sewing, wants to get back into exercising, book club        PERSONAL HISTORY:    - From [place], [place], 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 Has 1 sister in [place]; still has family in [place]; parents still in [place]      Current Outpatient Medications   Medication Sig Dispense Refill     acetaminophen (TYLENOL) 325 MG tablet Take 650 mg by mouth every 6 (six) hours as needed for pain. Takes 2 tablets every 8 hours if needed for pain       cholecalciferol, vitamin D3, (VITAMIN D3) 5,000 unit Tab Take by mouth daily.       ibuprofen (ADVIL,MOTRIN) 200 MG tablet Take 400 mg by mouth as needed for pain.       oxybutynin (DITROPAN) 5 MG tablet TAKE 1 TABLET BY MOUTH EVERY DAY 90 tablet 3     valACYclovir (VALTREX) 500 MG tablet 1000 mg (2 tabs) twice daily x 2 days with outbreak, then 500 mg (1 tab) twice daily for up to 6 more days 20 tablet 12     No current facility-administered medications for this visit.       Objective:   Vital Signs:   Visit Vitals  /64 (Patient Site: Left Arm, Patient Position: Sitting, Cuff Size: Adult Large)   Pulse 71   Temp 98  F (36.7  C) (Temporal)   Ht 5' 4\" (1.626 m)   Wt 163 lb (73.9 kg)   SpO2 96%   BMI 27.98 kg/m           VisionScreening:  No exam data present     PHYSICAL EXAM  Physical Examination: General appearance - alert, well appearing, and in no distress  Mental status - alert, oriented to person, place, and time  Eyes - pupils equal and reactive, extraocular eye movements intact  Ears - bilateral TM's and external ear canals normal  Neck - supple, no significant adenopathy  Chest - clear to auscultation, no wheezes, rales or rhonchi, symmetric air entry  Heart - normal rate, regular rhythm, normal S1, S2, no murmurs, rubs, clicks or gallops  Abdomen - soft, nontender, nondistended, no masses or organomegaly  Breasts - breasts " appear normal, no suspicious masses, no skin or nipple changes or axillary nodes  Neurological - alert, oriented, normal speech, no focal findings or movement disorder noted  Extremities - peripheral pulses normal, no pedal edema, no clubbing or cyanosis  Skin - normal coloration and turgor, no rashes, no suspicious skin lesions noted    Lab Results   Component Value Date    COLORU Yellow 05/10/2021    CLARITYU Slightly Cloudy (!) 05/10/2021    GLUCOSEU Negative 05/10/2021    BILIRUBINUR Small (!) 05/10/2021    KETONESU Negative 05/10/2021    SPECGRAV 1.015 05/10/2021    HGBUA Moderate (!) 05/10/2021    PHUR 7.5 05/10/2021    PROTEINUA 100 mg/dL (!) 05/10/2021    UROBILINOGEN 1.0 E.U./dL 05/10/2021    NITRITE Negative 05/10/2021    LEUKOCYTESUR Large (!) 05/10/2021    BACTERIA Moderate (!) 05/10/2021    RBCUA 5-10 (!) 05/10/2021    WBCUA >100 (!) 05/10/2021    SQUAMEPI 0-5 05/10/2021       Assessment Results 5/10/2021   Activities of Daily Living No help needed   Instrumental Activities of Daily Living No help needed   Mini Cog Total Score 5   Some recent data might be hidden     A Mini-Cog score of 0-2 suggests the possibility of dementia, score of 3-5 suggests no dementia    Identified Health Risks:     The patient was provided with written information regarding signs of hearing loss.  Information on urinary incontinence and treatment options given to patient.  She is at risk for falling and has been provided with information to reduce the risk of falling at home.  Information regarding advance directives (living vasquez), including where she can download the appropriate form, was provided to the patient via the AVS.

## 2021-06-17 NOTE — TELEPHONE ENCOUNTER
----- Message from Joy Schreiber MD sent at 5/10/2021  9:22 AM CDT -----  Please let patient know that her urine sample shows that she has a UTI.  I will send a prescription for Bactrim DS to her pharmacy to take twice daily for 3 days to treat this infection.  Urine culture will be sent as well.

## 2021-06-18 ENCOUNTER — OFFICE VISIT - HEALTHEAST (OUTPATIENT)
Dept: AUDIOLOGY | Facility: CLINIC | Age: 71
End: 2021-06-18

## 2021-06-18 DIAGNOSIS — H90.42 SENSORINEURAL HEARING LOSS (SNHL) OF LEFT EAR WITH UNRESTRICTED HEARING OF RIGHT EAR: ICD-10-CM

## 2021-06-18 DIAGNOSIS — H93.13 TINNITUS OF BOTH EARS: ICD-10-CM

## 2021-06-18 NOTE — PATIENT INSTRUCTIONS - HE
Patient Instructions by Joy Schreiber MD at 3/4/2020  8:20 AM     Author: Joy Schreiber MD Service: -- Author Type: Physician    Filed: 3/4/2020  8:29 AM Encounter Date: 3/4/2020 Status: Signed    : Joy Schreiber MD (Physician)         Patient Education   Signs of Hearing Loss  Hearing loss is a problem shared by many people. In fact, it is one of the most common health conditions, particularly as people age. Most people over age 65 have some hearing loss, and by age 80, almost everyone does. Because hearing loss usually occurs slowly over the years, you may not realize your hearing ability has gotten worse.       Have your hearing checked  Contact your [facility] care provider if you:    Have to strain to hear normal conversation.    Have to watch other peoples faces very carefully to follow what theyre saying.    Need to ask people to repeat what theyve said.    Often misunderstand what people are saying.    Turn the volume of the television or radio up so high that others complain.    Feel that people are mumbling when theyre talking to you.    Find that the effort to hear leaves you feeling tired and irritated.    Notice, when using the phone, that you hear better with 1 ear than the other.    7835-8708 The [employer]. [address] [place] [address], [place] [id number]. All rights reserved. This information is not intended as a substitute for professional medical care. Always follow your healthcare professional's instructions.         Patient Education   Understanding Advance Care Planning  Advance care planning is the process of deciding ones own future medical care. It helps ensure that if you cant speak for yourself, your wishes can still be carried out. The plan is a series of legal documents that note a persons wishes. The documents vary by state. Advance care planning may be done when a person has a serious illness that is expected to get worse. It may be done before major surgery. And it can help  you and your family be prepared in case of a major illness or injury. Advance care planning helps with making decisions at these times.       A health care proxy is a person who acts as the voice of a patient when the patient cant speak for himself or herself. The name of this role varies by state. It may be called a Durable Medical Power of  or Durable Power of  for Healthcare. It may be called an agent, surrogate, or advocate. Or it may be called a representative or decision maker. It is an official duty that is identified by a legal document. The document also varies by state.    Why Is Advance Care Planning Important?  If a person communicates their healthcare wishes:    They will be given medical care that matches their values and goals.    Their family members will not be forced to make decisions in a crisis with no guidance.  Creating a Plan  Making an advance care plan is often done in 3 steps:    Thinking about ones wishes. To create an advance care plan, you should think about what kind of medical treatment you would want if you lose the ability to communicate. Are there any situations in which you would refuse or stop treatment? Are there therapies you would want or not want? And whom do you want to make decisions for you? There are many places to learn more about how to plan for your care. Ask your doctor or  for resources.    Picking a health care proxy. This means choosing a trusted person to speak for you only when you cant speak for yourself. When you cannot make medical decisions, your proxy makes sure the instructions in your advance care plan are followed. A proxy does not make decisions based on his or her own opinions. They must put aside those opinions and values if needed, and carry out your wishes.    Filling out the legal documents. There are several kinds of legal documents for advance care planning. Each one tells health care providers your wishes. The  documents may vary by state. They must be signed and may need to be witnessed or notarized. You can cancel or change them whenever you wish. Depending on your state, the documents may include a Healthcare Proxy form, Living Will, Durable Medical Power of , Advance Directive, or others.  The Familys Role  The best help a family can give is to support their loved ones wishes. Open and honest communication is vital. Family should express any concerns they have about the patients choices while the patient can still make decisions.    9456-9050 [employer]. [address] [place] [address]. All rights reserved. This information is not intended as a substitute for professional medical care. Always follow your healthcare professional's instructions.         Also, [facility] [employer] [place] offers a free, downloadable health care directive that allows you to share your treatment choices and personal preferences if you cannot communicate your wishes. It also allows you to appoint another person (called a health care agent) to make health care decisions if you are unable to do so. You can download an advance directive by going here: [email or web address]     Patient Education   Personalized Prevention Plan  You are due for the preventive services outlined below.  Your care team is available to assist you in scheduling these services.  If you have already completed any of these items, please share that information with your care team to update in your medical record.  Health Maintenance   Topic Date Due   ? DXA SCAN  01/06/2019   ? ZOSTER VACCINES (3 of 3) 09/13/2019   ? MEDICARE ANNUAL WELLNESS VISIT  03/22/2020   ? FALL RISK ASSESSMENT  03/22/2020   ? MAMMOGRAM  03/02/2022   ? ADVANCE CARE PLANNING  01/31/2023   ? LIPID  03/22/2024   ? TD 18+ HE  03/22/2029   ? COLONOSCOPY  04/04/2029   ? HEPATITIS C SCREENING  Completed   ? PNEUMOCOCCAL IMMUNIZATION 65+ LOW/MEDIUM  RISK  Completed   ? INFLUENZA VACCINE RULE BASED  Completed

## 2021-06-18 NOTE — PATIENT INSTRUCTIONS - HE
Patient Instructions by Joy Schreiber MD at 5/10/2021  8:20 AM     Author: Joy Schreiber MD Service: -- Author Type: Physician    Filed: 5/10/2021  8:20 AM Encounter Date: 5/10/2021 Status: Signed    : Joy Schreiber MD (Physician)         Patient Education   Signs of Hearing Loss  Hearing loss is a problem shared by many people. In fact, it is one of the most common health conditions, particularly as people age. Most people over age 65 have some hearing loss, and by age 80, almost everyone does. Because hearing loss usually occurs slowly over the years, you may not realize your hearing ability has gotten worse.       Have your hearing checked  Contact your [facility] care provider if you:    Have to strain to hear normal conversation.    Have to watch other peoples faces very carefully to follow what theyre saying.    Need to ask people to repeat what theyve said.    Often misunderstand what people are saying.    Turn the volume of the television or radio up so high that others complain.    Feel that people are mumbling when theyre talking to you.    Find that the effort to hear leaves you feeling tired and irritated.    Notice, when using the phone, that you hear better with 1 ear than the other.    7059-6462 [employer]. [address] [place] [address], [place] [address]. All rights reserved. This information is not intended as a substitute for professional medical care. Always follow your healthcare professional's instructions.         Patient Education   Urinary Incontinence, Female (Adult)  Urinary incontinence means loss of control of the bladder. This problem affects many women, especially as they get older. If you have incontinence, you may be embarrassed to ask for help. But know that this problem can be treated.  Types of Incontinence  There are different types of incontinence. Two of the main types are described here. You can have more than one type.    Stress incontinence. With this type, urine  leaks when pressure (stress) is put on the bladder. This may happen when you cough, sneeze, or laugh. Stress incontinence most often occurs because the pelvic floor muscles that support the bladder and urethra are weak. This can happen after pregnancy and vaginal childbirth or a hysterectomy. It can also be due to excess body weight or hormone changes.    Urge incontinence (also called overactive bladder). With this type, a sudden urge to urinate is felt often. This may happen even though there may not be much urine in the bladder. The need to urinate often during the night is common. Urge incontinence most often occurs because of bladder spasms. This may be due to bladder irritation or infection. Damage to bladder nerves or pelvic muscles, constipation, and certain medicines can also lead to urge incontinence.  Treatment of urinary incontinence depends on the cause. Further evaluation is needed to find the type you have. This will likely include an exam and certain tests. Based on the results, you and your healthcare provider can then plan treatment. Until a diagnosis is made, the home care tips below can help relieve symptoms.  Home care    Do pelvic floor muscle exercises, if they are prescribed. The pelvic floor muscles help support the bladder and urethra. Many women find that their symptoms improve when doing special exercises that strengthen these muscles. To do the exercises contract the muscles you would use to stop your stream of urine, but do this when youre not urinating. Hold for 10 seconds, then relax. Repeat 10 to 20 times in a row, at least 3 times a day. Your provider may give you other instructions for how to do the exercises and how often.    Keep a bladder diary. This helps track how often and how much you urinate over a set period of time. Bring this diary with you to your next visit with the provider. The information can help your provider learn more about your bladder problem.    Lose weight,  if advised to by your provider. Excess weight puts pressure on the bladder. Your provider can help you create a weight-loss plan thats right for you. This may include exercising more and making certain diet changes.    Don't consume foods and drinks that may irritate the bladder. These can include alcohol and caffeinated drinks.    Quit smoking. Smoking and other tobacco use can lead to chronic cough that strains the pelvic floor muscles. Smoking may also damage the bladder and urethra. Talk with your provider about treatments or methods you can use to quit smoking.    If drinking large amounts of fluid causes you to have symptoms, you may be advised to limit your fluid intake. You may also be advised to drink most of your fluids during the day and to limit fluids at night.    If youre worried about urine leakage or accidents, you may wear absorbent pads to catch urine. Change the pads often. This helps reduce discomfort. It may also reduce the risk of skin or bladder infections.  Follow-up care  Follow up with your healthcare provider, or as directed. It may take some to find the right treatment for your problem. Your treatment plan may include special therapies or medicines. Certain procedures or surgery may also be options. Be sure to discuss any questions you have with your provider.  When to seek medical advice  Call the healthcare provider right away if any of these occur:    Fever of 100.4 F (38 C) or higher, or as directed by your provider    Bladder pain or fullness    Abdominal swelling    Nausea or vomiting    Back pain    Weakness, dizziness or fainting  Date Last Reviewed: 10/1/2017    6040-9746 [employer]. [address] [place] [address], [place] [address]. All rights reserved. This information is not intended as a substitute for professional medical care. Always follow your healthcare professional's instructions.     Patient Education   Preventing Falls in the Home  As you get older, falls are  more likely. Thats because your reaction time slows. Your muscles and joints may also get stiffer, making them less flexible. Illness, medications, and vision changes can also affect your balance. A fall could leave you unable to live on your own. To make your home safer, follow these tips:    Floors    Put nonskid pads under area rugs.    Remove throw rugs.    Replace worn floor coverings.    Tack carpets firmly to each step on carpeted stairs. Put nonskid strips on the edges of uncarpeted stairs.    Keep floors and stairs free of clutter and cords.    Arrange furniture so there are clear pathways.    Clean up any spills right away.    Bathrooms    Install grab bars in the tub or shower.    Apply nonskid strips or put a nonskid rubber mat in the tub or shower.    Sit on a bath chair to bathe.    Use bathmats with nonskid backing.    Lighting    Keep a flashlight in each room.    Put a nightlight along the pathway between the bedroom and the bathroom.    2007-9548 [employer]. [address] [place] [address]. All rights reserved. This information is not intended as a substitute for professional medical care. Always follow your healthcare professional's instructions.         Patient Education   Understanding Advance Care Planning  Advance care planning is the process of deciding ones own future medical care. It helps ensure that if you cant speak for yourself, your wishes can still be carried out. The plan is a series of legal documents that note a persons wishes. The documents vary by state. Advance care planning may be done when a person has a serious illness that is expected to get worse. It may be done before major surgery. And it can help you and your family be prepared in case of a major illness or injury. Advance care planning helps with making decisions at these times.       A health care proxy is a person who acts as the voice of a patient when the patient cant speak for himself or  herself. The name of this role varies by state. It may be called a Durable Medical Power of  or Durable Power of  for Healthcare. It may be called an agent, surrogate, or advocate. Or it may be called a representative or decision maker. It is an official duty that is identified by a legal document. The document also varies by state.    Why Is Advance Care Planning Important?  If a person communicates their healthcare wishes:    They will be given medical care that matches their values and goals.    Their family members will not be forced to make decisions in a crisis with no guidance.  Creating a Plan  Making an advance care plan is often done in 3 steps:    Thinking about ones wishes. To create an advance care plan, you should think about what kind of medical treatment you would want if you lose the ability to communicate. Are there any situations in which you would refuse or stop treatment? Are there therapies you would want or not want? And whom do you want to make decisions for you? There are many places to learn more about how to plan for your care. Ask your doctor or  for resources.    Picking a health care proxy. This means choosing a trusted person to speak for you only when you cant speak for yourself. When you cannot make medical decisions, your proxy makes sure the instructions in your advance care plan are followed. A proxy does not make decisions based on his or her own opinions. They must put aside those opinions and values if needed, and carry out your wishes.    Filling out the legal documents. There are several kinds of legal documents for advance care planning. Each one tells health care providers your wishes. The documents may vary by state. They must be signed and may need to be witnessed or notarized. You can cancel or change them whenever you wish. Depending on your state, the documents may include a Healthcare Proxy form, Living Will, Durable Medical Power of  , Advance Directive, or others.  The Familys Role  The best help a family can give is to support their loved ones wishes. Open and honest communication is vital. Family should express any concerns they have about the patients choices while the patient can still make decisions.    3849-5985 [employer]. [address] [place] [address]. All rights reserved. This information is not intended as a substitute for professional medical care. Always follow your healthcare professional's instructions.         Also, [facility] offers a free, downloadable health care directive that allows you to share your treatment choices and personal preferences if you cannot communicate your wishes. It also allows you to appoint another person (called a health care agent) to make health care decisions if you are unable to do so. You can download an advance directive by going here: [email or web address]     Patient Education   Personalized Prevention Plan  You are due for the preventive services outlined below.  Your care team is available to assist you in scheduling these services.  If you have already completed any of these items, please share that information with your care team to update in your medical record.  There are no preventive care reminders to display for this patient.

## 2021-07-04 NOTE — PROGRESS NOTES
"Progress Notes by Joy Kee AuD at 6/18/2021  3:00 PM     Author: Joy Kee AuD Service: -- Author Type: Audiologist    Filed: 6/18/2021  5:13 PM Encounter Date: 6/18/2021 Status: Signed    : Joy Kee AuD (Audiologist)       Audiology only; referred by Joy Schreiber    Summary:  Audiology visit completed. Please see audiogram below or under \"media\" tab for history and results.    Transducer:  Both insert phones and circumaural headphones were used.    Reliability:    Good    Recommendations:  Follow-up with PCP; ENT consult recommended due to asymmetrical sensorineural hearing loss (scheduled with ENT 7-15-21 at patient request). Retest hearing annually (to monitor) or per medical management/patient concern.  Wear hearing protection consistently in noise to preserve residual hearing sensitivity and to minimize the effects of tinnitus.  Neela Bonner is not an ideal amplification candidate at this time. Ms. Bonner expressed verbal understanding of this information and plan.    Sintia Sherman, [facility]  [place] Licensed Audiologist [id number]           "

## 2021-07-04 NOTE — ADDENDUM NOTE
Addendum Note by Clementine Davidson CMA at 5/10/2021  2:15 PM     Author: Clementine Davidson CMA Service: -- Author Type: Certified Medical Assistant    Filed: 5/10/2021  2:15 PM Encounter Date: 5/10/2021 Status: Signed    : Clementine Davidson CMA (Certified Medical Assistant)    Addended by: CLEMENTINE DAVIDSON on: 5/10/2021 02:15 PM        Modules accepted: Orders

## 2021-07-12 ENCOUNTER — HOSPITAL ENCOUNTER (OUTPATIENT)
Dept: PHYSICAL THERAPY | Facility: REHABILITATION | Age: 71
End: 2021-07-12
Payer: COMMERCIAL

## 2021-07-12 DIAGNOSIS — N39.3 FEMALE STRESS INCONTINENCE: Primary | ICD-10-CM

## 2021-07-12 PROCEDURE — 97140 MANUAL THERAPY 1/> REGIONS: CPT | Mod: GP | Performed by: PHYSICAL THERAPIST

## 2021-07-12 PROCEDURE — 97110 THERAPEUTIC EXERCISES: CPT | Mod: GP | Performed by: PHYSICAL THERAPIST

## 2021-07-13 ENCOUNTER — RECORDS - HEALTHEAST (OUTPATIENT)
Dept: ADMINISTRATIVE | Facility: CLINIC | Age: 71
End: 2021-07-13

## 2021-07-15 ENCOUNTER — OFFICE VISIT (OUTPATIENT)
Dept: OTOLARYNGOLOGY | Facility: CLINIC | Age: 71
End: 2021-07-15
Payer: COMMERCIAL

## 2021-07-15 DIAGNOSIS — H90.3 SNHL (SENSORY-NEURAL HEARING LOSS), ASYMMETRICAL: Primary | ICD-10-CM

## 2021-07-15 PROCEDURE — 99203 OFFICE O/P NEW LOW 30 MIN: CPT | Performed by: OTOLARYNGOLOGY

## 2021-07-15 NOTE — LETTER
7/15/2021         RE: Neela Bonner  [address] [facility] [address]        Dear Colleague,    Thank you for referring your patient, Neela Bonner, to the [facility]. Please see a copy of my visit note below.    HPI: This patient is a 70yo F who presents for evaluation of hearing at the request of Dr. Schreiber. There has been a gradual loss of hearing over the past 3-4 years in both ears. Denies otalgia, otorrhea, vertigo, and other major medical issues. Has been around moderate noise and has no relevant family history. There is bilateral, non-pulsatile tinnitus, most noticeable when it is quiet.    Past medical history, surgical history, social history, family history, medications, and allergies have been reviewed with the patient and are documented above.    Review of Systems: a 10-system review was performed. Pertinent positives are noted in the HPI and on a separate scanned document in the chart.    PHYSICAL EXAMINATION:  GEN: no acute distress, normocephalic  EYES: extraocular movements are intact, pupils are equal and round. Sclera clear.   EARS: auricles are normally formed. The external auditory canals are clear with minimal to no cerumen. Tympanic membranes are intact bilaterally with no signs of infection, effusion, retractions, or perforations.  NOSE: anterior nares are patent.   OC/OP: clear, dentition is in good repair. The tongue and palate are fully mobile and symmetric. No masses or lesions.  NECK: soft and supple. No lymphadenopathy or masses. Airway is midline.  NEURO: CN VII and XII symmetric. alert and oriented. No spontaneous nystagmus. Gait is normal.  PULM: breathing comfortably on room air, normal chest expansion with respiration  CARDS: no cyanosis or clubbing, normal carotid pulses    AUDIOGRAM: mild left HFNSHL, normal hearing right. No tymps. WRS 92L/100R    MEDICAL DECISION-MAKING: This patient is a 70yo F with a mild asymmetrical  sensorineural hearing loss on the left. Discussed hearing protection. Will need an MRI of the IACs to look into the asymmetry. Will call her with the results when available.        Again, thank you for allowing me to participate in the care of your patient.        Sincerely,        Piper Kelley MD

## 2021-07-15 NOTE — PROGRESS NOTES
HPI: This patient is a 72yo F who presents for evaluation of hearing at the request of Dr. Schreiber. There has been a gradual loss of hearing over the past 3-4 years in both ears. Denies otalgia, otorrhea, vertigo, and other major medical issues. Has been around moderate noise and has no relevant family history. There is bilateral, non-pulsatile tinnitus, most noticeable when it is quiet.    Past medical history, surgical history, social history, family history, medications, and allergies have been reviewed with the patient and are documented above.    Review of Systems: a 10-system review was performed. Pertinent positives are noted in the HPI and on a separate scanned document in the chart.    PHYSICAL EXAMINATION:  GEN: no acute distress, normocephalic  EYES: extraocular movements are intact, pupils are equal and round. Sclera clear.   EARS: auricles are normally formed. The external auditory canals are clear with minimal to no cerumen. Tympanic membranes are intact bilaterally with no signs of infection, effusion, retractions, or perforations.  NOSE: anterior nares are patent.   OC/OP: clear, dentition is in good repair. The tongue and palate are fully mobile and symmetric. No masses or lesions.  NECK: soft and supple. No lymphadenopathy or masses. Airway is midline.  NEURO: CN VII and XII symmetric. alert and oriented. No spontaneous nystagmus. Gait is normal.  PULM: breathing comfortably on room air, normal chest expansion with respiration  CARDS: no cyanosis or clubbing, normal carotid pulses    AUDIOGRAM: mild left HFNSHL, normal hearing right. No tymps. WRS 92L/100R    MEDICAL DECISION-MAKING: This patient is a 72yo F with a mild asymmetrical sensorineural hearing loss on the left. Discussed hearing protection. Will need an MRI of the IACs to look into the asymmetry. Will call her with the results when available.

## 2021-07-21 ENCOUNTER — RECORDS - HEALTHEAST (OUTPATIENT)
Dept: ADMINISTRATIVE | Facility: CLINIC | Age: 71
End: 2021-07-21

## 2021-07-22 ENCOUNTER — RECORDS - HEALTHEAST (OUTPATIENT)
Dept: FAMILY MEDICINE | Facility: CLINIC | Age: 71
End: 2021-07-22

## 2021-07-22 DIAGNOSIS — Z12.31 OTHER SCREENING MAMMOGRAM: ICD-10-CM

## 2021-07-26 ENCOUNTER — HOSPITAL ENCOUNTER (OUTPATIENT)
Dept: PHYSICAL THERAPY | Facility: REHABILITATION | Age: 71
End: 2021-07-26
Payer: COMMERCIAL

## 2021-07-26 DIAGNOSIS — N81.89 PELVIC FLOOR WEAKNESS: ICD-10-CM

## 2021-07-26 DIAGNOSIS — N39.3 STRESS INCONTINENCE: ICD-10-CM

## 2021-07-26 DIAGNOSIS — N39.3 FEMALE STRESS INCONTINENCE: Primary | ICD-10-CM

## 2021-07-26 DIAGNOSIS — N39.41 URGE INCONTINENCE: ICD-10-CM

## 2021-07-26 PROCEDURE — 97110 THERAPEUTIC EXERCISES: CPT | Mod: GP | Performed by: PHYSICAL THERAPIST

## 2021-08-23 ENCOUNTER — HOSPITAL ENCOUNTER (OUTPATIENT)
Dept: MRI IMAGING | Facility: CLINIC | Age: 71
Discharge: HOME | End: 2021-08-23
Attending: OTOLARYNGOLOGY | Admitting: OTOLARYNGOLOGY
Payer: COMMERCIAL

## 2021-08-23 DIAGNOSIS — H90.3 SNHL (SENSORY-NEURAL HEARING LOSS), ASYMMETRICAL: ICD-10-CM

## 2021-08-23 PROCEDURE — 255N000002 HC RX 255 OP 636: Performed by: OTOLARYNGOLOGY

## 2021-08-23 PROCEDURE — 70553 MRI BRAIN STEM W/O & W/DYE: CPT

## 2021-08-23 PROCEDURE — A9585 GADOBUTROL INJECTION: HCPCS | Performed by: OTOLARYNGOLOGY

## 2021-08-23 RX ORDER — GADOBUTROL 604.72 MG/ML
4 INJECTION INTRAVENOUS ONCE
Status: COMPLETED | OUTPATIENT
Start: 2021-08-23 | End: 2021-08-23

## 2021-08-23 RX ADMIN — GADOBUTROL 4 ML: 604.72 INJECTION INTRAVENOUS at 10:12

## 2021-09-15 NOTE — PROGRESS NOTES
Outpatient Physical Therapy Discharge Note     Patient: Neela Bonner  : 1950    Beginning/End Dates of Reporting Period:  21 to 21    Referring Provider: Dr. Joy Schreiber    Therapy Diagnosis:stress incontinence     Client Self Report: Has not had any accidents.  Has been able to laugh without leaking urine.  No problems with the exercises.      Objective Measurements:  Objective Measure: rib angle has decreased to 96 degrees (from 104)                         Goals:  Goal Identifier self management   Goal Description  Patient will be able to self manage stress incontinence    Target Date     Date Met  21   Progress (detail required for progress note): met     Goal Identifier laugh   Goal Description  Patient will be able to laugh without leaking urine   Target Date     Date Met  21   Progress (detail required for progress note): met     Goal Identifier sleeping   Goal Description     Target Date     Date Met  21   Progress (detail required for progress note): met       Plan:  Discharge from therapy.    Discharge:    Reason for Discharge: Patient has met all goals.  Patient chooses to discontinue therapy.    Equipment Issued: none    Discharge Plan: Patient to continue home program.

## 2021-09-15 NOTE — ADDENDUM NOTE
Encounter addended by: Vicki Burns, PT on: 9/15/2021 11:51 AM   Actions taken: Episode resolved, Clinical Note Signed, Flowsheet accepted

## 2021-09-25 ENCOUNTER — HEALTH MAINTENANCE LETTER (OUTPATIENT)
Age: 71
End: 2021-09-25

## 2021-11-08 ENCOUNTER — TRANSFERRED RECORDS (OUTPATIENT)
Dept: HEALTH INFORMATION MANAGEMENT | Facility: CLINIC | Age: 71
End: 2021-11-08
Payer: COMMERCIAL

## 2022-04-13 ENCOUNTER — TRANSFERRED RECORDS (OUTPATIENT)
Dept: HEALTH INFORMATION MANAGEMENT | Facility: CLINIC | Age: 72
End: 2022-04-13
Payer: COMMERCIAL

## 2022-05-27 ENCOUNTER — ANCILLARY PROCEDURE (OUTPATIENT)
Dept: MAMMOGRAPHY | Facility: HOSPITAL | Age: 72
End: 2022-05-27
Attending: FAMILY MEDICINE
Payer: COMMERCIAL

## 2022-05-27 DIAGNOSIS — Z12.31 SCREENING MAMMOGRAM, ENCOUNTER FOR: ICD-10-CM

## 2022-05-27 PROCEDURE — 77067 SCR MAMMO BI INCL CAD: CPT

## 2022-06-01 ASSESSMENT — ENCOUNTER SYMPTOMS
HEADACHES: 0
DYSURIA: 0
PARESTHESIAS: 0
SHORTNESS OF BREATH: 0
MYALGIAS: 0
ARTHRALGIAS: 0
CHILLS: 0
FREQUENCY: 0
COUGH: 0
HEMATOCHEZIA: 0
DIZZINESS: 0
HEMATURIA: 0
ABDOMINAL PAIN: 0
HEARTBURN: 0
JOINT SWELLING: 0
SORE THROAT: 0
FEVER: 0
EYE PAIN: 0
BREAST MASS: 0
NAUSEA: 0
NERVOUS/ANXIOUS: 0
WEAKNESS: 0
DIARRHEA: 0
PALPITATIONS: 0
CONSTIPATION: 0

## 2022-06-01 ASSESSMENT — ACTIVITIES OF DAILY LIVING (ADL): CURRENT_FUNCTION: NO ASSISTANCE NEEDED

## 2022-06-02 ENCOUNTER — OFFICE VISIT (OUTPATIENT)
Dept: FAMILY MEDICINE | Facility: CLINIC | Age: 72
End: 2022-06-02
Payer: COMMERCIAL

## 2022-06-02 VITALS
SYSTOLIC BLOOD PRESSURE: 112 MMHG | BODY MASS INDEX: 27.33 KG/M2 | DIASTOLIC BLOOD PRESSURE: 64 MMHG | TEMPERATURE: 98.2 F | OXYGEN SATURATION: 96 % | WEIGHT: 160.1 LBS | HEART RATE: 56 BPM | HEIGHT: 64 IN

## 2022-06-02 DIAGNOSIS — K21.9 GASTROESOPHAGEAL REFLUX DISEASE WITHOUT ESOPHAGITIS: ICD-10-CM

## 2022-06-02 DIAGNOSIS — Z23 HIGH PRIORITY FOR 2019-NCOV VACCINE: ICD-10-CM

## 2022-06-02 DIAGNOSIS — Z13.820 OSTEOPOROSIS SCREENING: ICD-10-CM

## 2022-06-02 DIAGNOSIS — Z86.19 H/O COLD SORES: ICD-10-CM

## 2022-06-02 DIAGNOSIS — N31.8 HYPERTONICITY OF BLADDER: ICD-10-CM

## 2022-06-02 DIAGNOSIS — E78.2 MIXED HYPERLIPIDEMIA: ICD-10-CM

## 2022-06-02 DIAGNOSIS — Z00.00 ENCOUNTER FOR MEDICARE ANNUAL WELLNESS EXAM: Primary | ICD-10-CM

## 2022-06-02 DIAGNOSIS — N95.8 OTHER SPECIFIED MENOPAUSAL AND PERIMENOPAUSAL DISORDERS: ICD-10-CM

## 2022-06-02 LAB
ALBUMIN SERPL-MCNC: 3.7 G/DL (ref 3.5–5)
ALP SERPL-CCNC: 69 U/L (ref 45–120)
ALT SERPL W P-5'-P-CCNC: 16 U/L (ref 0–45)
ANION GAP SERPL CALCULATED.3IONS-SCNC: 8 MMOL/L (ref 5–18)
AST SERPL W P-5'-P-CCNC: 14 U/L (ref 0–40)
BILIRUB SERPL-MCNC: 0.8 MG/DL (ref 0–1)
BUN SERPL-MCNC: 16 MG/DL (ref 8–28)
CALCIUM SERPL-MCNC: 9.3 MG/DL (ref 8.5–10.5)
CHLORIDE BLD-SCNC: 105 MMOL/L (ref 98–107)
CHOLEST SERPL-MCNC: 242 MG/DL
CO2 SERPL-SCNC: 28 MMOL/L (ref 22–31)
CREAT SERPL-MCNC: 0.78 MG/DL (ref 0.6–1.1)
FASTING STATUS PATIENT QL REPORTED: YES
GFR SERPL CREATININE-BSD FRML MDRD: 81 ML/MIN/1.73M2
GLUCOSE BLD-MCNC: 84 MG/DL (ref 70–125)
HDLC SERPL-MCNC: 54 MG/DL
HGB BLD-MCNC: 14.7 G/DL (ref 11.7–15.7)
LDLC SERPL CALC-MCNC: 172 MG/DL
POTASSIUM BLD-SCNC: 4.4 MMOL/L (ref 3.5–5)
PROT SERPL-MCNC: 6.7 G/DL (ref 6–8)
SODIUM SERPL-SCNC: 141 MMOL/L (ref 136–145)
TRIGL SERPL-MCNC: 80 MG/DL

## 2022-06-02 PROCEDURE — 0064A COVID-19,PF,MODERNA (18+ YRS BOOSTER .25ML): CPT | Performed by: FAMILY MEDICINE

## 2022-06-02 PROCEDURE — 85018 HEMOGLOBIN: CPT | Performed by: FAMILY MEDICINE

## 2022-06-02 PROCEDURE — 36415 COLL VENOUS BLD VENIPUNCTURE: CPT | Performed by: FAMILY MEDICINE

## 2022-06-02 PROCEDURE — 80053 COMPREHEN METABOLIC PANEL: CPT | Performed by: FAMILY MEDICINE

## 2022-06-02 PROCEDURE — 80061 LIPID PANEL: CPT | Performed by: FAMILY MEDICINE

## 2022-06-02 PROCEDURE — 99397 PER PM REEVAL EST PAT 65+ YR: CPT | Mod: 25 | Performed by: FAMILY MEDICINE

## 2022-06-02 PROCEDURE — 91306 COVID-19,PF,MODERNA (18+ YRS BOOSTER .25ML): CPT | Performed by: FAMILY MEDICINE

## 2022-06-02 ASSESSMENT — ENCOUNTER SYMPTOMS
ABDOMINAL PAIN: 0
HEMATOCHEZIA: 0
DIZZINESS: 0
BREAST MASS: 0
FREQUENCY: 0
DIARRHEA: 0
WEAKNESS: 0
MYALGIAS: 0
PARESTHESIAS: 0
JOINT SWELLING: 0
PALPITATIONS: 0
COUGH: 0
SORE THROAT: 0
FEVER: 0
NAUSEA: 0
HEMATURIA: 0
CHILLS: 0
DYSURIA: 0
CONSTIPATION: 0
HEARTBURN: 0
ARTHRALGIAS: 0
HEADACHES: 0
NERVOUS/ANXIOUS: 0
SHORTNESS OF BREATH: 0
EYE PAIN: 0

## 2022-06-02 ASSESSMENT — ACTIVITIES OF DAILY LIVING (ADL): CURRENT_FUNCTION: NO ASSISTANCE NEEDED

## 2022-06-02 NOTE — PROGRESS NOTES
"SUBJECTIVE:   Neela Bonner is a 72 year old female who presents for Preventive Visit.  We reviewed her health history.  No significant changes in her health since she was seen in May 2021 for her last physical.  We reviewed and updated medications and allergies as well as family and social history.  She gets regular vision and dental exams.  She exercises by walking and doing Pilates.  She has history of osteoarthritis of the left hip and both knees.  She has previously been seen by orthopedic specialist and has received cortisone injections.  Currently not experiencing any significant hip or knee pain that is bothersome. She tries to follow healthy diet and get adequate calcium and vitamin D intake.  She has history of vitamin D deficiency and remains on a vitamin D supplement.  She has history of hyperlipidemia that is not currently being treated with medications.  She has history of hyperactivity of the bladder as well as stress incontinence.  Takes oxybutynin.    This medication is working well.  She also did pelvic floor physical therapy last year and that helped improve her symptoms.  She has history of cold sores and uses valacyclovir as needed.  She is up-to-date on mammogram.  She had colonoscopy in April 2022.  5 year follow-up recommended.    She reports history of GERD symptoms but those have not bothered her for about a month.   Would to take Tums as needed.  Review of systems is assessed and is otherwise negative.  No other questions today.    Patient has been advised of split billing requirements and indicates understanding: Yes  Are you in the first 12 months of your Medicare coverage?  no    Healthy Habits:     In general, how would you rate your overall health?  Good    Frequency of exercise:  4-5 days/week    Duration of exercise:  30-45 minutes    Do you usually eat at least 4 servings of fruit and vegetables a day, include whole grains    & fiber and avoid regularly eating high fat or \"junk\" " foods?  Yes    Taking medications regularly:  Yes    Medication side effects:  None    Ability to successfully perform activities of daily living:  No assistance needed    Home Safety:  No safety concerns identified    Hearing Impairment:  No hearing concerns    In the past 6 months, have you been bothered by leaking of urine?  No    In general, how would you rate your overall mental or emotional health?  Good      PHQ-2 Total Score: 0    Additional concerns today:  No    Do you feel safe in your environment? Yes    Have you ever done Advance Care Planning? (For example, a Health Directive, POLST, or a discussion with a medical provider or your loved ones about your wishes): No, advance care planning information given to patient to review.  Advanced care planning was discussed at today's visit.       Fall risk  Fallen 2 or more times in the past year?: No  Any fall with injury in the past year?: No    Cognitive Screening   1) Repeat 3 items (Leader, Season, Table)    2) Clock draw: NORMAL  3) 3 item recall: Recalls 3 objects  Results: 3 items recalled: COGNITIVE IMPAIRMENT LESS LIKELY    Mini-CogTM Copyright [employer]. Licensed by the author for use in [facility]; reprinted with permission (radha@[facility]). All rights reserved.      Do you have sleep apnea, excessive snoring or daytime drowsiness?: no    Reviewed and updated as needed this visit by clinical staff   Tobacco   Meds                Reviewed and updated as needed this visit by Provider                   Social History     Tobacco Use     Smoking status: Never Smoker     Smokeless tobacco: Never Used   Substance Use Topics     Alcohol use: Yes     Alcohol/week: 1.0 standard drink     Comment: Alcoholic Drinks/day: social      If you drink alcohol do you typically have >3 drinks per day or >7 drinks per week? No    Alcohol Use 6/1/2022   Prescreen: >3 drinks/day or >7 drinks/week? No               Current providers sharing in care for this  patient include:   Patient Care Team:  Joy Schreiber MD as PCP - General  Joy Schreiber MD as Assigned PCP  Piper Kelley MD as Assigned Surgical Provider    The following health maintenance items are reviewed in Epic and correct as of today:  Health Maintenance Due   Topic Date Due     ANNUAL REVIEW OF HM ORDERS  Never done     DEXA  01/06/2022     COVID-19 Vaccine (4 - Booster for Moderna series) 03/08/2022     MEDICARE ANNUAL WELLNESS VISIT  05/10/2022     Lab work is in process  Current Outpatient Medications   Medication Sig Dispense Refill     acetaminophen (TYLENOL) 325 MG tablet [ACETAMINOPHEN (TYLENOL) 325 MG TABLET] Take 650 mg by mouth every 6 (six) hours as needed for pain. Takes 2 tablets every 8 hours if needed for pain       cholecalciferol, vitamin D3, (VITAMIN D3) 5,000 unit Tab [CHOLECALCIFEROL, VITAMIN D3, (VITAMIN D3) 5,000 UNIT TAB] Take by mouth daily.       ibuprofen (ADVIL,MOTRIN) 200 MG tablet [IBUPROFEN (ADVIL,MOTRIN) 200 MG TABLET] Take 400 mg by mouth as needed for pain.       oxybutynin (DITROPAN) 5 MG tablet TAKE 1 TABLET BY MOUTH EVERY DAY 90 tablet 0     valACYclovir (VALTREX) 500 MG tablet TAKE 2 TABS TWICE DAILY X 2 DAYS WITH OUTBREAK, THEN 1 TAB TWICE DAILY FOR UP TO 6 MORE DAYS 20 tablet 0             Review of Systems   Constitutional: Negative for chills and fever.   HENT: Negative for congestion, ear pain, hearing loss and sore throat.    Eyes: Negative for pain and visual disturbance.   Respiratory: Negative for cough and shortness of breath.    Cardiovascular: Negative for chest pain, palpitations and peripheral edema.   Gastrointestinal: Negative for abdominal pain, constipation, diarrhea, heartburn, hematochezia and nausea.   Breasts:  Negative for tenderness, breast mass and discharge.   Genitourinary: Negative for dysuria, frequency, genital sores, hematuria, pelvic pain, urgency, vaginal bleeding and vaginal discharge.   Musculoskeletal: Negative for  "arthralgias, joint swelling and myalgias.   Skin: Negative for rash.   Neurological: Negative for dizziness, weakness, headaches and paresthesias.   Psychiatric/Behavioral: Negative for mood changes. The patient is not nervous/anxious.          OBJECTIVE:   /64 (BP Location: Left arm, Cuff Size: Adult Regular)   Pulse 56   Temp 98.2  F (36.8  C) (Oral)   Ht 1.626 m (5' 4\")   Wt 72.6 kg (160 lb 1.6 oz)   SpO2 96%   BMI 27.48 kg/m   Estimated body mass index is 27.48 kg/m  as calculated from the following:    Height as of this encounter: 1.626 m (5' 4\").    Weight as of this encounter: 72.6 kg (160 lb 1.6 oz).  Physical Exam  GENERAL APPEARANCE: healthy, alert and no distress  EYES: Eyes grossly normal to inspection, PERRL and conjunctivae and sclerae normal  HENT: ear canals and TM's normal  NECK: no adenopathy, no asymmetry, masses, or scars and thyroid normal to palpation  RESP: lungs clear to auscultation - no rales, rhonchi or wheezes  BREAST: normal without masses, tenderness or nipple discharge and no palpable axillary masses or adenopathy  CV: regular rate and rhythm, normal S1 S2, no S3 or S4, no murmur, click or rub, no peripheral edema and peripheral pulses strong  ABDOMEN: soft, nontender, no hepatosplenomegaly, no masses and bowel sounds normal  MS: no musculoskeletal defects are noted and gait is age appropriate without ataxia  SKIN: no suspicious lesions or rashes  NEURO: Normal strength and tone, sensory exam grossly normal, mentation intact and speech normal  PSYCH: mentation appears normal and affect normal/bright        ASSESSMENT / PLAN:   Neela was seen today for physical, recheck medication and imm/inj.    Diagnoses and all orders for this visit:    Encounter for Medicare annual wellness exam  -     Hemoglobin; Future  -COVID booster administered today.  Mammogram up-to-date.  Colonoscopy up-to-date with next 1 due in 2027.  Bone density scan ordered.  Continue with regular exercise, " "healthy diet and adequate calcium and vitamin D intake.  Check fasting lipids, hemoglobin and CMP.  Return to clinic in 1 year for next physical    High priority for 2019-nCoV vaccine  -     COVID-19,PF,MODERNA (18+ Yrs BOOSTER .25mL)    Osteoporosis screening  -     DEXA HIP/PELVIS/SPINE - Future; Future    Other specified menopausal and perimenopausal disorders   -     DEXA HIP/PELVIS/SPINE - Future; Future    H/O cold sores  Continue valacyclovir as needed    Hypertonicity of bladder  Stable on current dose of oxybutynin.  This will be continued    Mixed hyperlipidemia  -     Comprehensive metabolic panel (BMP + Alb, Alk Phos, ALT, AST, Total. Bili, TP); Future  -     Lipid panel reflex to direct LDL Fasting; Future  - Not currently treated with lipid-lowering medication.  Encouraged regular exercise, healthy diet and weight management    Gastroesophageal reflux disease without esophagitis  Discussed dietary modifications.  Okay for Tums as needed.  Also discussed H2 blockers and PPI therapy as options in future if symptoms become more bothersome again    Other orders  -     REVIEW OF HEALTH MAINTENANCE PROTOCOL ORDERS  -     PRIMARY CARE FOLLOW-UP SCHEDULING; Future            COUNSELING:  Reviewed preventive health counseling, as reflected in patient instructions    Estimated body mass index is 27.48 kg/m  as calculated from the following:    Height as of this encounter: 1.626 m (5' 4\").    Weight as of this encounter: 72.6 kg (160 lb 1.6 oz).        She reports that she has never smoked. She has never used smokeless tobacco.      Appropriate preventive services were discussed with this patient, including applicable screening as appropriate for cardiovascular disease, diabetes, osteopenia/osteoporosis, and glaucoma.  As appropriate for age/gender, discussed screening for colorectal cancer, prostate cancer, breast cancer, and cervical cancer. Checklist reviewing preventive services available has been given to " the patient.    Reviewed patients plan of care and provided an AVS. The Basic Care Plan (routine screening as documented in Health Maintenance) for Neela meets the Care Plan requirement. This Care Plan has been established and reviewed with the Patient.    Counseling Resources:  ATP IV Guidelines  Pooled Cohorts Equation Calculator  Breast Cancer Risk Calculator  Breast Cancer: Medication to Reduce Risk  FRAX Risk Assessment  ICSI Preventive Guidelines  Dietary Guidelines for Americans, 2010  USDA's MyPlate  ASA Prophylaxis  Lung CA Screening    Joy Schreiber MD  [facility]    Identified Health Risks:

## 2022-06-02 NOTE — PATIENT INSTRUCTIONS
Patient Education   Personalized Prevention Plan  You are due for the preventive services outlined below.  Your care team is available to assist you in scheduling these services.  If you have already completed any of these items, please share that information with your care team to update in your medical record.  Health Maintenance Due   Topic Date Due     Osteoporosis Screening  01/06/2022     COVID-19 Vaccine (4 - Booster for Moderna series) 03/08/2022

## 2022-06-07 DIAGNOSIS — E78.2 MIXED HYPERLIPIDEMIA: Primary | ICD-10-CM

## 2022-06-08 ENCOUNTER — NURSE TRIAGE (OUTPATIENT)
Dept: NURSING | Facility: CLINIC | Age: 72
End: 2022-06-08
Payer: COMMERCIAL

## 2022-06-08 ENCOUNTER — TELEPHONE (OUTPATIENT)
Dept: FAMILY MEDICINE | Facility: CLINIC | Age: 72
End: 2022-06-08
Payer: COMMERCIAL

## 2022-06-08 NOTE — TELEPHONE ENCOUNTER
----- Message from Joy Schreiber MD sent at 6/7/2022  3:02 PM CDT -----  Please let patient know that her cholesterol levels have increased since last year.  Based on her risk factors, it is recommended that we consider medication to lower her cholesterol and reduce her risk of developing coronary artery disease.  First, I would like her to try intensive lifestyle changes for 6 months and then recheck cholesterol.  If cholesterol levels are about the same or increased, I would then recommend starting cholesterol-lowering medication.  She can come in for lab only appointment in 6 months.    The 10-year ASCVD risk score ([place] GAMA Jr., et al., 2013) is: 7.8%    Values used to calculate the score:      Age: 72 years      Sex: Female      Is Non- : No      Diabetic: No      Tobacco smoker: No      Systolic Blood Pressure: 112 mmHg      Is BP treated: No      HDL Cholesterol: 54 mg/dL      Total Cholesterol: 242 mg/dL

## 2022-06-08 NOTE — TELEPHONE ENCOUNTER
Pt returning call about lab results. Per chart review, information relayed to pt regarding cholesterol level and lifestyle change. Pt verbalized understanding and would like more information as to how to lower levels in dietary and lifestyle changes.     Please send information via [email or web address], pt will be watching for it.      Karthikeyan Alfaro, RN, BSN on 6/8/2022 at 3:52 PM  [facility]        Additional Information    Information only question and nurse able to answer    Negative: Nursing judgment    Negative: Nursing judgment    Negative: Nursing judgment    Negative: Nursing judgment    Protocols used: INFORMATION ONLY CALL - NO TRIAGE-A-[place]

## 2022-06-09 NOTE — TELEPHONE ENCOUNTER
I am not sure how to send information through [employer].  I placed some information in my top box.  Please mail this to patient

## 2022-08-01 DIAGNOSIS — N31.8 OTHER NEUROMUSCULAR DYSFUNCTION OF BLADDER: ICD-10-CM

## 2022-08-02 RX ORDER — OXYBUTYNIN CHLORIDE 5 MG/1
TABLET ORAL
Qty: 90 TABLET | Refills: 3 | Status: SHIPPED | OUTPATIENT
Start: 2022-08-02 | End: 2023-09-01

## 2022-08-02 NOTE — TELEPHONE ENCOUNTER
"    Last Written Prescription Date:  5/8/2022  Last Fill Quantity: 90,  # refills: 0   Last office visit provider:  6/2/2022     Requested Prescriptions   Pending Prescriptions Disp Refills     oxybutynin (DITROPAN) 5 MG tablet [Pharmacy Med Name: OXYBUTYNIN 5 MG TABLET] 90 tablet 0     Sig: TAKE 1 TABLET BY MOUTH EVERY DAY       Muscarinic Antagonists (Urinary Incontinence Agents) Passed - 8/1/2022 10:41 AM        Passed - Recent (12 mo) or future (30 days) visit within the authorizing provider's specialty     Patient has had an office visit with the authorizing provider or a provider within the authorizing providers department within the previous 12 mos or has a future within next 30 days. See \"Patient Info\" tab in inbasket, or \"Choose Columns\" in Meds & Orders section of the refill encounter.              Passed - Medication is Oxybutynin and patient is 5 years of age or older        Passed - Patient does not have a diagnosis of glaucoma on the problem list     If glaucoma diagnosis is new, refer refill to physician.          Passed - Medication is active on med list        Passed - Patient is 18 years of age or older             Alena Bowling RN 08/02/22 1:22 PM  "

## 2022-08-19 ENCOUNTER — TRANSFERRED RECORDS (OUTPATIENT)
Dept: HEALTH INFORMATION MANAGEMENT | Facility: CLINIC | Age: 72
End: 2022-08-19

## 2022-08-30 ENCOUNTER — TRANSFERRED RECORDS (OUTPATIENT)
Dept: HEALTH INFORMATION MANAGEMENT | Facility: CLINIC | Age: 72
End: 2022-08-30

## 2022-09-07 ENCOUNTER — OFFICE VISIT (OUTPATIENT)
Dept: FAMILY MEDICINE | Facility: CLINIC | Age: 72
End: 2022-09-07
Payer: COMMERCIAL

## 2022-09-07 VITALS
SYSTOLIC BLOOD PRESSURE: 114 MMHG | WEIGHT: 158.4 LBS | TEMPERATURE: 98.6 F | BODY MASS INDEX: 27.19 KG/M2 | DIASTOLIC BLOOD PRESSURE: 66 MMHG | OXYGEN SATURATION: 97 % | HEART RATE: 62 BPM

## 2022-09-07 DIAGNOSIS — N31.8 HYPERTONICITY OF BLADDER: ICD-10-CM

## 2022-09-07 DIAGNOSIS — M67.431 GANGLION CYST OF DORSUM OF RIGHT WRIST: ICD-10-CM

## 2022-09-07 DIAGNOSIS — R10.31 RIGHT LOWER QUADRANT PAIN: ICD-10-CM

## 2022-09-07 DIAGNOSIS — Z01.818 PRE-OP EXAM: Primary | ICD-10-CM

## 2022-09-07 DIAGNOSIS — E78.2 MIXED HYPERLIPIDEMIA: ICD-10-CM

## 2022-09-07 DIAGNOSIS — Z86.19 H/O COLD SORES: ICD-10-CM

## 2022-09-07 PROCEDURE — 93005 ELECTROCARDIOGRAM TRACING: CPT | Performed by: FAMILY MEDICINE

## 2022-09-07 PROCEDURE — 90662 IIV NO PRSV INCREASED AG IM: CPT | Performed by: FAMILY MEDICINE

## 2022-09-07 PROCEDURE — 93010 ELECTROCARDIOGRAM REPORT: CPT | Performed by: INTERNAL MEDICINE

## 2022-09-07 PROCEDURE — 99214 OFFICE O/P EST MOD 30 MIN: CPT | Mod: 25 | Performed by: FAMILY MEDICINE

## 2022-09-07 PROCEDURE — G0008 ADMIN INFLUENZA VIRUS VAC: HCPCS | Performed by: FAMILY MEDICINE

## 2022-09-07 ASSESSMENT — PAIN SCALES - GENERAL: PAINLEVEL: NO PAIN (0)

## 2022-09-07 NOTE — PROGRESS NOTES
[facility]  [address] [facility] [address]  [facility] [address]  Phone: [phone]  Fax: [phone]  Primary Provider: Joy Wang  Pre-op Performing Provider: JOY WANG      PREOPERATIVE EVALUATION:  Today's date: 9/7/2022    Neela Bonner is a 72 year old female who presents for a preoperative evaluation.    Surgical Information:  Surgery/Procedure: right wrist dorsal mass excision   Surgery Location: [facility] [phone]  Surgeon: Dr. Paiz  Surgery Date: 10/7/22  Time of Surgery: TBD  Where patient plans to recover: At home with family  Fax number for surgical facility: [phone]    Type of Anesthesia Anticipated: to be determined    Assessment & Plan     The proposed surgical procedure is considered LOW risk.    Pre-op exam  No contraindications or significant risk factors to planned procedure.  She has appointment for COVID test scheduled 2 days prior to surgery.  Advised avoidance of NSAIDs and aspirin 1 week prior to procedure  - EKG 12-lead, tracing only    Ganglion cyst of dorsum of right wrist  Okay to proceed with planned surgery    Mixed hyperlipidemia  Not on lipid-lowering medication.  Working on lifestyle changes.  She will plan to recheck lipids in December    H/O cold sores  Continue valacyclovir as needed.  No change in regimen prior to surgery    Hyperactivity Of The Bladder  Continue oxybutynin.  No change in regimen prior to surgery    Right lower quadrant pain  Will obtain ultrasound to evaluate for hernia and will also obtain pelvic ultrasound.  Further recommendations will be made once results are available.  - US Hernia Evaluation  - US Pelvic Complete with Transvaginal                   RECOMMENDATION:  APPROVAL GIVEN to proceed with proposed procedure, without further diagnostic evaluation.                      Subjective     HPI related to upcoming procedure: She has a ganglion cyst on her right wrist that is causing discomfort.   Scheduled for excision of this cyst.    We reviewed her medications, allergies and health history.  She has history of hyperlipidemia.  Not on lipid-lowering medication at this time.  Working on lifestyle changes.  Has history of overactive bladder.  She is on oxybutynin.  She has history of cold sores.  Takes valacyclovir as needed.    Overall has been feeling quite good recently.  Review of systems is assessed.  She does report having some intermittent sharp pains in her right lower abdomen/pelvis.  This tends to occur at night when she rolls over towards her right side.  Experiences a very sharp pain.  Recalls having similar type pain many years ago following her hysterectomy.  She is not sure if this is from some scar tissue or if it could be a hernia.  She does not notice any bulges in the area.  She does have some occasional constipation.  She still has both of her ovaries.    Review of systems is assessed and is otherwise negative.  No other concerns today.      Preop Questions 8/31/2022   1. Have you ever had a heart attack or stroke? No   2. Have you ever had surgery on your heart or blood vessels, such as a stent placement, a coronary artery bypass, or surgery on an artery in your head, neck, heart, or legs? No   3. Do you have chest pain with activity? No   4. Do you have a history of  heart failure? No   5. Do you currently have a cold, bronchitis or symptoms of other infection? No   6. Do you have a cough, shortness of breath, or wheezing? No   7. Do you or anyone in your family have previous history of blood clots? No   8. Do you or does anyone in your family have a serious bleeding problem such as prolonged bleeding following surgeries or cuts? No   9. Have you ever had problems with anemia or been told to take iron pills? No   10. Have you had any abnormal blood loss such as black, tarry or bloody stools, or abnormal vaginal bleeding? No   11. Have you ever had a blood transfusion? No   12. Are you  willing to have a blood transfusion if it is medically needed before, during, or after your surgery? Yes   13. Have you or any of your relatives ever had problems with anesthesia? No   14. Do you have sleep apnea, excessive snoring or daytime drowsiness? No   15. Do you have any artifical heart valves or other implanted medical devices like a pacemaker, defibrillator, or continuous glucose monitor? No   16. Do you have artificial joints? No   17. Are you allergic to latex? No       Health Care Directive:  Patient does not have a Health Care Directive or Living Will: Discussed advance care planning with patient; information given to patient to review.    Preoperative Review of :   reviewed - no record of controlled substances prescribed.          Review of Systems  Constitutional, neuro, ENT, endocrine, pulmonary, cardiac, gastrointestinal, genitourinary, musculoskeletal, integument and psychiatric systems are negative, except as otherwise noted.    Patient Active Problem List    Diagnosis Date Noted     Hyperlipidemia      Priority: Medium     No meds  History to date per PCP, Dr Nichole:   - 1997 nonfasting: T245/H65  - 9/4/09 [facility], fasting: T237/TG60/H55/L170; started fish oil 4g/day  - 2/9/10 fasting:T253/TG57/H54/L187; renewed efforts at lifestyle mod  - 9/22/10: T235/TG62/H60/L163, cont diet/exercise/wt loss  - 10/11/11 fasting: T253/TG55/H65/L177, declines meds, cont   diet/exercise/wt loss  - 11/30/12: T227/TG90/H59/L150, improved, cont lifestyle modifications &   wt loss efforts  - 12/30/13 fasting: T264/TG91/H64/L182; advised lifestyle modifications &   wt loss  - 12/30/14 fasting: T245/TG65/H62/L170; advised lifestyle modifications &   wt loss  - 2/15/16 fasting: T266/TG70/H62/cL190; reviewed 2/22/2016, pt tryin TLC,   recheck lipids 3-6 mo         Menopause Has Occurred      Priority: Medium     Created by Conversion  Replacement Utility updated for latest IMO load         Overweight       Priority: Medium     - 09: BMI 26.6  - 3/17/14: BMI 27.6  - 1/15/15: BMI 28.4 (165#)  - 2016: BMI 28.2 (167#)         Cardiac risk counseling 02/15/2016     Priority: Medium     CV Risk Review 2/15/2016:    Risk factors = #1 (female >54 y/o)    Current/recent measurements:  - lipids 2/15/16 fasting: T266/TG70/H62/cL190  - glucose 2/15/16 fastin  - BP: __  - BMI: __ (__#)    [place] 10 year Heart Disease Risk: 7% (from UpToDate calculator)    Recommendations: goal LDL <160 and consider/offer start statin bc LDL   >190; TLC/weight loss         Patellar Chondromalacia      Priority: Medium     - 5/4/10 MRI: patellar chondromalacia w/ grade 4 changes & full thickness   defect  - 7/8/10 [facility], Dr Connor's PA: referred for PT then prn cortisone   vs synvisc injections  - 11/16/15 Dr Connor, [facility]: possible R knee meniscal tear; MRI   ordered; f/u after MRI  - 11/23/15 [facility]: MRI R knee showed severe chondromalacia, no   meniscal tear; cortisone injection done R knee; referred for PT; if still   symptoms in 6 wks consider viscosupplementation injections         H/O cold sores      Priority: Medium     Oral Herpes. Takes Valtrex prn  - 1/15/2015: 1 outbreak in past year, renewed prescription//sds         Hyperactivity Of The Bladder      Priority: Medium     - uses prn oxybutinin (originally rxd by Dr Nomi Macario, [facility]; PCP/Dionisio rxing as of 1/15/2015)  - sometimes leaking with valsalva, sometimes urgency         Benign Adenoma Of The Large Intestine      Priority: Medium     - 14 [facility], repeat screening colonoscopy: 2mm polyp ileocecal   valve (path=hyperplastic polyp); 4mm polyp splenic flexure (path=sessile   serrated adenoma, no overt dysplasia); 4mm polyp sigmoid colon (not   removed due to pt discomfort); repeat 5 yrs using MAC & propofol         History of being hospitalized      Priority: Medium     1958 for T&A, otherwise only for births          Localized Primary Osteoarthritis Of The Left Hip 06/27/2011     Priority: Medium     - 6/27/11 [facility]: mild degenerative changes per xray left   hip, advised stretching, prn analgesics, if worsening consider MRI          Past Medical History:   Diagnosis Date     Chickenpox 12 y/o     Dermatophytosis of nail 2008, 2009 2008. Recurrence 1/2009. Painful.  Treating with oral lamisil x3 months then recheck. Checked LFTs & Cr (wnl)     Esophageal reflux 8/15/2011    - 8/15/11: c/o chest discomfort, bloating sensation, +epigastric tender, rxd omeprazole 20mg XR/day - 10/2011: took PPI x1 mo, then stopped and symptoms have not returned     Fibrocystic breast      H/O exercise stress test 8/24/11    - 8/24/11 exercise Stress Test, [facility]: neg test, no EKG changes, no inducible ischemia, good exercise tolerance (105% of predicted)     Past Surgical History:   Procedure Laterality Date     COLONOSCOPY  3/10/04    - 3/10/04 [facility], screening colonoscpopy WNL, repeat 10 years     COLONOSCOPY W/ BIOPSIES  4/1/14    - 4/1/14 [facility], repeat screening colonoscopy: 2mm polyp ileocecal valve (path=hyperplastic polyp); 4mm polyp splenic flexure (path=sessile serrated adenoma, no overt dysplasia); 4mm polyp sigmoid colon (not removed due to pt discomfort); repeat 5 yrs using MAC & propofol     HC REMOVE TONSILS/ADENOIDS,<13 Y/O  1956 (7 y/o)    T&A     HYSTERECTOMY  1990's     OTHER SURGICAL HISTORY Right 10/26/05    [place] REVISE MEDIAN N/CARPAL TUNNEL SURG- 10/26/05 Dr Duarte, [facility]: RIGHT carpal tunnel release     OTHER SURGICAL HISTORY Left 6/2007    [place] REVISE MEDIAN N/CARPAL TUNNEL SURG- 6/2007 [facility]: L carpal tunnel release     OTHER SURGICAL HISTORY  7/9/07    [place] SLING OPER STRES INCONTINENCE- 7/9/07 Dr Chni: TVT for stress incontinence     [facility] TOTAL ABDOM HYSTERECTOMY  5/1999    - 5/1999: ANNALEE for fibroids, benign, by Dr. Mitchell  ([facility]). Cervix taken. Does NOT need paps (per Dionisio 2/2010)     Current Outpatient Medications   Medication Sig Dispense Refill     acetaminophen (TYLENOL) 325 MG tablet [ACETAMINOPHEN (TYLENOL) 325 MG TABLET] Take 650 mg by mouth every 6 (six) hours as needed for pain. Takes 2 tablets every 8 hours if needed for pain       cholecalciferol, vitamin D3, (VITAMIN D3) 5,000 unit Tab [CHOLECALCIFEROL, VITAMIN D3, (VITAMIN D3) 5,000 UNIT TAB] Take by mouth daily.       HEMP OIL OR EXTRACT OR OTHER CBD CANNABINOID, NOT MEDICAL CANNABIS,        ibuprofen (ADVIL,MOTRIN) 200 MG tablet [IBUPROFEN (ADVIL,MOTRIN) 200 MG TABLET] Take 400 mg by mouth as needed for pain.       oxybutynin (DITROPAN) 5 MG tablet TAKE 1 TABLET BY MOUTH EVERY DAY 90 tablet 3     valACYclovir (VALTREX) 500 MG tablet TAKE 2 TABS TWICE DAILY X 2 DAYS WITH OUTBREAK, THEN 1 TAB TWICE DAILY FOR UP TO 6 MORE DAYS 20 tablet 0       Allergies   Allergen Reactions     Erythromycin Base [Erythromycin] Hives        Social History     Tobacco Use     Smoking status: Never Smoker     Smokeless tobacco: Never Used   Substance Use Topics     Alcohol use: Yes     Alcohol/week: 1.0 standard drink     Comment: Alcoholic Drinks/day: social        History   Drug Use No         Objective     /66 (BP Location: Right arm, Cuff Size: Adult Regular)   Pulse 62   Temp 98.6  F (37  C) (Temporal)   Wt 71.8 kg (158 lb 6.4 oz)   SpO2 97%   BMI 27.19 kg/m      Physical Exam    GENERAL APPEARANCE: healthy, alert and no distress     EYES: EOMI, PERRL     NECK: no adenopathy, no asymmetry, masses, or scars and thyroid normal to palpation     RESP: lungs clear to auscultation - no rales, rhonchi or wheezes     CV: regular rates and rhythm, normal S1 S2, no S3 or S4 and no murmur, click or rub     ABDOMEN:  soft, nontender, no HSM or masses and bowel sounds normal     MS: extremities normal- no gross deformities noted, no evidence of inflammation in joints, FROM in all  extremities.     SKIN: no suspicious lesions or rashes     NEURO: Normal strength and tone, sensory exam grossly normal, mentation intact and speech normal     PSYCH: mentation appears normal. and affect normal/bright    Recent Labs   Lab Test 06/02/22  1002 05/10/21  0854   HGB 14.7 15.1    140   POTASSIUM 4.4 4.4   CR 0.78 0.84        Diagnostics:  No labs were ordered during this visit.   EKG: sinus bradycardia, unchanged from previous tracings    Revised Cardiac Risk Index (RCRI):  The patient has the following serious cardiovascular risks for perioperative complications:   - No serious cardiac risks = 0 points     RCRI Interpretation: 0 points: Class I (very low risk - 0.4% complication rate)           Signed Electronically by: Joy Schreiber MD  Copy of this evaluation report is provided to requesting physician.

## 2022-09-08 LAB
ATRIAL RATE - MUSE: 47 BPM
DIASTOLIC BLOOD PRESSURE - MUSE: NORMAL MMHG
INTERPRETATION ECG - MUSE: NORMAL
P AXIS - MUSE: 35 DEGREES
PR INTERVAL - MUSE: 166 MS
QRS DURATION - MUSE: 72 MS
QT - MUSE: 432 MS
QTC - MUSE: 382 MS
R AXIS - MUSE: -28 DEGREES
SYSTOLIC BLOOD PRESSURE - MUSE: NORMAL MMHG
T AXIS - MUSE: 18 DEGREES
VENTRICULAR RATE- MUSE: 47 BPM

## 2022-09-13 ENCOUNTER — HOSPITAL ENCOUNTER (OUTPATIENT)
Dept: ULTRASOUND IMAGING | Facility: CLINIC | Age: 72
Discharge: HOME | End: 2022-09-13
Attending: FAMILY MEDICINE
Payer: COMMERCIAL

## 2022-09-13 DIAGNOSIS — R10.31 RIGHT LOWER QUADRANT PAIN: ICD-10-CM

## 2022-09-13 PROCEDURE — 76705 ECHO EXAM OF ABDOMEN: CPT

## 2022-09-13 PROCEDURE — 76856 US EXAM PELVIC COMPLETE: CPT

## 2022-10-04 ENCOUNTER — TRANSFERRED RECORDS (OUTPATIENT)
Dept: HEALTH INFORMATION MANAGEMENT | Facility: CLINIC | Age: 72
End: 2022-10-04

## 2022-10-05 ENCOUNTER — LAB (OUTPATIENT)
Dept: LAB | Facility: CLINIC | Age: 72
End: 2022-10-05
Payer: COMMERCIAL

## 2022-10-05 DIAGNOSIS — Z01.818 PRE-OP EXAM: Primary | ICD-10-CM

## 2022-10-05 LAB — SARS-COV-2 RNA RESP QL NAA+PROBE: NEGATIVE

## 2022-10-05 PROCEDURE — U0005 INFEC AGEN DETEC AMPLI PROBE: HCPCS

## 2022-10-05 PROCEDURE — U0003 INFECTIOUS AGENT DETECTION BY NUCLEIC ACID (DNA OR RNA); SEVERE ACUTE RESPIRATORY SYNDROME CORONAVIRUS 2 (SARS-COV-2) (CORONAVIRUS DISEASE [COVID-19]), AMPLIFIED PROBE TECHNIQUE, MAKING USE OF HIGH THROUGHPUT TECHNOLOGIES AS DESCRIBED BY CMS-2020-01-R: HCPCS

## 2022-10-07 ENCOUNTER — TRANSFERRED RECORDS (OUTPATIENT)
Dept: HEALTH INFORMATION MANAGEMENT | Facility: CLINIC | Age: 72
End: 2022-10-07

## 2022-10-20 ENCOUNTER — TRANSFERRED RECORDS (OUTPATIENT)
Dept: HEALTH INFORMATION MANAGEMENT | Facility: CLINIC | Age: 72
End: 2022-10-20

## 2022-11-07 ENCOUNTER — HOSPITAL ENCOUNTER (OUTPATIENT)
Dept: BONE DENSITY | Facility: HOSPITAL | Age: 72
Discharge: HOME | End: 2022-11-07
Attending: FAMILY MEDICINE | Admitting: FAMILY MEDICINE
Payer: COMMERCIAL

## 2022-11-07 DIAGNOSIS — N95.8 OTHER SPECIFIED MENOPAUSAL AND PERIMENOPAUSAL DISORDERS: ICD-10-CM

## 2022-11-07 DIAGNOSIS — Z13.820 OSTEOPOROSIS SCREENING: ICD-10-CM

## 2022-11-07 PROCEDURE — 77080 DXA BONE DENSITY AXIAL: CPT

## 2022-11-10 ENCOUNTER — TRANSFERRED RECORDS (OUTPATIENT)
Dept: HEALTH INFORMATION MANAGEMENT | Facility: CLINIC | Age: 72
End: 2022-11-10

## 2022-11-11 ENCOUNTER — NURSE TRIAGE (OUTPATIENT)
Dept: NURSING | Facility: CLINIC | Age: 72
End: 2022-11-11

## 2022-11-11 ENCOUNTER — TRANSFERRED RECORDS (OUTPATIENT)
Dept: HEALTH INFORMATION MANAGEMENT | Facility: CLINIC | Age: 72
End: 2022-11-11

## 2022-11-11 ENCOUNTER — OFFICE VISIT (OUTPATIENT)
Dept: FAMILY MEDICINE | Facility: CLINIC | Age: 72
End: 2022-11-11
Payer: COMMERCIAL

## 2022-11-11 VITALS
SYSTOLIC BLOOD PRESSURE: 123 MMHG | DIASTOLIC BLOOD PRESSURE: 78 MMHG | RESPIRATION RATE: 16 BRPM | WEIGHT: 157 LBS | HEART RATE: 57 BPM | OXYGEN SATURATION: 97 % | BODY MASS INDEX: 26.95 KG/M2 | TEMPERATURE: 98 F

## 2022-11-11 DIAGNOSIS — H65.193 ACUTE EFFUSION OF BOTH MIDDLE EARS: ICD-10-CM

## 2022-11-11 DIAGNOSIS — H81.11 BENIGN PAROXYSMAL POSITIONAL VERTIGO OF RIGHT EAR: Primary | ICD-10-CM

## 2022-11-11 PROCEDURE — 99213 OFFICE O/P EST LOW 20 MIN: CPT | Performed by: PHYSICIAN ASSISTANT

## 2022-11-11 RX ORDER — FLUTICASONE PROPIONATE 50 MCG
2 SPRAY, SUSPENSION (ML) NASAL DAILY
Qty: 9.9 ML | Refills: 0 | Status: SHIPPED | OUTPATIENT
Start: 2022-11-11 | End: 2022-11-21

## 2022-11-11 RX ORDER — MECLIZINE HYDROCHLORIDE 25 MG/1
25 TABLET ORAL 3 TIMES DAILY PRN
Qty: 10 TABLET | Refills: 0 | Status: SHIPPED | OUTPATIENT
Start: 2022-11-11 | End: 2023-06-02

## 2022-11-11 NOTE — TELEPHONE ENCOUNTER
Pt is phoning stating that she is having some vertigo on and off since October     Pt states that it is worse in the morning     Pt was exercising yesterday and states that the dizziness was very bad     Pt states that she will furniture walk at times because of dizziness     Per Disposition: Go To ED/[facility] Now (Or To Office With PCP Approval)    Pt will be heading to [facility] now for [facility] - [facility] opens at 10am and pt will have someone drive her there now to be there when they open     Care advice given per protocol and when to call back. Pt verbalized understanding and agrees to plan of care.    Rajwinder Menezes RN  [facility] Nurse Advisor  9:33 AM 11/11/2022      COVID 19 Nurse Triage Plan/Patient Instructions    Please be aware that novel coronavirus (COVID-19) may be circulating in the community. If you develop symptoms such as fever, cough, or SOB or if you have concerns about the presence of another infection including coronavirus (COVID-19), please contact your health care provider or visit [email or web address].     Disposition/Instructions    In-Person Visit with provider recommended. Reference Visit Selection Guide.    Thank you for taking steps to prevent the spread of this virus.  o Limit your contact with others.  o Wear a simple mask to cover your cough.  o Wash your hands well and often.    Resources    [facility]: About COVID-19: [email or web address]    CDC: What to Do If You're Sick: www.cdc.gov/coronavirus/2019-ncov/about/steps-when-sick.html    CDC: Ending Home Isolation: www.cdc.gov/coronavirus/2019-ncov/hcp/disposition-in-home-patients.html     CDC: Caring for Someone: www.cdc.gov/coronavirus/2019-ncov/if-you-are-sick/care-for-someone.html     [facility]: Interim Guidance for Hospital Discharge to Home: www.health.[facility].mn.us/diseases/coronavirus/hcp/hospdischarge.pdf    [facility] clinical trials (COVID-19 research studies):  clinicalaffairs.[facility]-clinical-trials     Below are the COVID-19 hotlines at the [place] Department of Health ([facility]). Interpreters are available.   o For health questions: Call 640-028-9018 or 1-447.253.1597 (7 a.m. to 7 p.m.)  o For questions about schools and childcare: Call 873-219-7823 or 1-233.889.4218 (7 a.m. to 7 p.m.)                     Reason for Disposition    SEVERE dizziness (e.g., unable to stand, requires support to walk, feels like passing out now)    Additional Information    Negative: SEVERE difficulty breathing (e.g., struggling for each breath, speaks in single words)    Negative: Shock suspected (e.g., cold/pale/clammy skin, too weak to stand, low BP, rapid pulse)    Negative: Difficult to awaken or acting confused (e.g., disoriented, slurred speech)    Negative: Fainted, and still feels dizzy afterwards    Negative: Overdose (accidental or intentional) of medications    Negative: New neurologic deficit that is present now: * Weakness of the face, arm, or leg on one side of the body * Numbness of the face, arm, or leg on one side of the body * Loss of speech or garbled speech    Negative: Heart beating < 50 beats per minute OR > 140 beats per minute    Negative: Sounds like a life-threatening emergency to the triager    Negative: Chest pain    Negative: Rectal bleeding, bloody stool, or tarry-black stool    Negative: Vomiting is main symptom    Negative: Diarrhea is main symptom    Negative: Headache is main symptom    Negative: Heat exhaustion suspected (i.e., dehydration from heat exposure)    Negative: Patient states that they are having an anxiety or panic attack    Negative: Dizziness from low blood sugar (i.e., < 60 mg/dl or 3.5 mmol/l)    Protocols used: DIZZINESS-A-OH

## 2022-11-11 NOTE — PROGRESS NOTES
Assessment & Plan:      Problem List Items Addressed This Visit    None  Visit Diagnoses     Benign paroxysmal positional vertigo of right ear    -  Primary    Relevant Medications    meclizine (ANTIVERT) 25 MG tablet    Other Relevant Orders    Physical Therapy Referral    Acute effusion of both middle ears        Relevant Medications    fluticasone (FLONASE) 50 MCG/ACT nasal spray        Medical Decision Making  Patient presents with on and off sensation of dizziness for 2 to 3 weeks.  Symptoms appear most consistent with BPPV possibly secondary to bilateral ear effusion.  Otherwise, no signs of stroke or ear infection.  No red flag symptoms of chest pains or shortness of breath.  Recommend trial of nasal steroids to help with ear effusion.  Will use meclizine to help with vertigo.  Also placed referral to physical therapy for ongoing symptoms.  Allergies and medication interactions reviewed.  Discussed signs of worsening symptoms and when to follow-up with PCP if no symptom improvement.     Subjective:      Neela Bonner is a 72 year old female here for evaluation of dizziness.  Onset of symptoms was 2 to 3 weeks ago.  Patient has had on and off symptoms since then.  Dizziness will be provoked with movements such as when rolling from left to right or when she sits up quickly.  After she lies still, dizziness will subside.  Patient otherwise denies severe headache, vision changes, and muscle weakness.  No fevers, cough, rhinorrhea, or ear pains.     The following portions of the patient's history were reviewed and updated as appropriate: allergies, current medications, and problem list.     Review of Systems  Pertinent items are noted in HPI.    Allergies  Allergies   Allergen Reactions     Erythromycin Base [Erythromycin] Hives       Family History   Problem Relation Age of Onset     Anxiety Disorder Daughter         anxiety on both sides of family; anxiety attacks; daughter had been on Effexor     Breast  Cancer Cousin         40s; maternal cousin     Coronary Artery Disease Maternal Grandfather      Coronary Artery Disease Maternal Aunt 75.00        Mat aunt: MI 74 y/o     Coronary Artery Disease Maternal Uncle         several mat uncles     Nephrolithiasis Mother      Filipe Parkinson White syndrome Mother         controlled on atenolol     Nephrolithiasis Brother      Obesity Brother         brothers obese     Filipe Parkinson White syndrome Brother         - Brother: attempted ablation (unsuccessful), controlled on atenolol     Breast Cancer Cousin         40s; maternal (bone mets)     Dementia Father      Heart Disease Father        Social History     Tobacco Use     Smoking status: Never     Smokeless tobacco: Never   Substance Use Topics     Alcohol use: Yes     Alcohol/week: 1.0 standard drink     Comment: Alcoholic Drinks/day: social         Objective:      /78   Pulse 57   Temp 98  F (36.7  C) (Oral)   Resp 16   Wt 71.2 kg (157 lb)   SpO2 97%   BMI 26.95 kg/m    General appearance - alert, well appearing, and in no distress and non-toxic  Ears -TMs intact with moderate mucoid fluid and bulging bilaterally, no erythema  Nose - normal and patent, no erythema, discharge or polyps  Mouth - mucous membranes moist, pharynx normal without lesions  Neck - supple, no significant adenopathy  Chest - clear to auscultation, no wheezes, rales or rhonchi, symmetric air entry  Heart - normal rate, regular rhythm, normal S1, S2, no murmurs, rubs, clicks or gallops  Neurologic - Novato-Hallpike maneuver negative bilaterally    The use of Dragon/Gojimo dictation services was used to construct the content of this note; any grammatical errors are non-intentional. Please contact the author directly if you are in need of any clarification.

## 2022-11-15 ENCOUNTER — HOSPITAL ENCOUNTER (OUTPATIENT)
Dept: PHYSICAL THERAPY | Facility: REHABILITATION | Age: 72
Discharge: HOME | End: 2022-11-15
Payer: COMMERCIAL

## 2022-11-15 DIAGNOSIS — H81.11 BENIGN PAROXYSMAL POSITIONAL VERTIGO OF RIGHT EAR: ICD-10-CM

## 2022-11-15 PROCEDURE — 97161 PT EVAL LOW COMPLEX 20 MIN: CPT | Mod: GP | Performed by: PHYSICAL THERAPIST

## 2022-11-15 PROCEDURE — 97112 NEUROMUSCULAR REEDUCATION: CPT | Mod: GP | Performed by: PHYSICAL THERAPIST

## 2022-11-15 NOTE — PROGRESS NOTES
[facility]                                                                                   OUTPATIENT PHYSICAL THERAPY FUNCTIONAL EVALUATION  PLAN OF TREATMENT FOR OUTPATIENT REHABILITATION  (COMPLETE FOR INITIAL CLAIMS ONLY)  Patient's Last Name, First Name, M.I.  YOB: 1950  Neela Bonner     Provider's Name   [facility]   Medical Record No.  [id number]     Start of Care Date:  11/15/22   Onset Date:  10/23/22   Type:     _X__PT   ____OT  ____SLP Medical Diagnosis:  Benign paroxysmal positional vertigo of right ear     PT Diagnosis:  decreased activity tolerance Visits from SOC:  1                              __________________________________________________________________________________  Plan of Treatment/Functional Goals:  neuromuscular re-education (canalith repositioning maneuver if future positional testing indicates it.)           GOALS  HEP  Patient will be independent in HEP and self management of symptoms  02/13/23    rolling  Patient will roll and turn head  in bed without vertigo  02/13/23    bending/ exercising  Patient will return to exercise including bending without vertigo.  02/13/23                                                           Therapy Frequency:  1 time/week   Predicted Duration of Therapy Intervention:  90 days    Donte Whitfield, PT                                    I CERTIFY THE NEED FOR THESE SERVICES FURNISHED UNDER        THIS PLAN OF TREATMENT AND WHILE UNDER MY CARE     (Physician co-signature of this document indicates review and certification of the therapy plan).              Certification Date From:  11/15/22   Certification Date To:  02/13/23    Referring Provider:  Cody Napier PA-C    Initial Assessment  See Epic Evaluation- Start of Care Date: 11/15/22       11/15/22 1200   Quick Adds   Quick Adds  Certification;Vestibular Eval   General Information   Start of Care Date 11/15/22   Referring Physician Cody Napier PA-C   Orders Evaluate and Treat as Indicated   Order Date 11/11/22   Medical Diagnosis Benign paroxysmal positional vertigo of right ear   Onset of illness/injury or Date of Surgery 10/23/22   Patient/Family Goals Statement manage vertigo   General Information Comments Patient complains of intermittent vertigo especially when rolling or turning in bed and bending down.   Fall Risk Screen   Fall screen completed by PT   Have you fallen 2 or more times in the past year? No   Have you fallen and had an injury in the past year? No   Is patient a fall risk? No   Abuse Screen (yes response referral indicated)   Feels Unsafe at Home or Work/School no   Feels Threatened by Someone no   Does Anyone Try to Keep You From Having Contact with Others or Doing Things Outside Your Home? no   Physical Signs of Abuse Present no   Patient needs abuse support services and resources No   System Outcome Measures   Outcome Measures BPPV   Dizziness Handicap Inventory (score out of 100) A decrease in score by 17.18 or greater indicates a clinically significant change in symptoms. 28   Cervicogenic Screen   Neck ROM WNL   Vertebral Artery Test Normal   Oculomotor Exam   Smooth Pursuit Normal   Saccades Normal   VOR Normal   VOR Cancellation Normal   Convergence Testing Normal   Infrared Goggle Exam or Frenzel Lense Exam   Vestibular Suppressant in Last 24 Hours? No   Exam completed with Room Light   Spontaneous Nystagmus Negative   Gaze Evoked Nystagmus Negative   Positional testing Negative   Rolando-Hallpike (right) Negative   Rolando-Hallpike (Left) Negative   Planned Therapy Interventions   Planned Therapy Interventions neuromuscular re-education  (canalith repositioning maneuver if future positional testing indicates it.)   Clinical Impression   Criteria for Skilled Therapeutic Interventions Met yes, treatment  indicated   PT Diagnosis decreased activity tolerance   Influenced by the following impairments vertigo   Functional limitations due to impairments difficulty rolling, bending, exercises   Clinical Presentation Stable/Uncomplicated   Clinical Decision Making (Complexity) Low complexity   Therapy Frequency 1 time/week   Predicted Duration of Therapy Intervention (days/wks) 90 days   Risk & Benefits of therapy have been explained Yes   Patient, Family & other staff in agreement with plan of care Yes   Clinical Impression Comments Patient presentation consistent with improving right horizontal canal BPPV based on testing and subjective history.   GOALS   PT Eval Goals 1;2;3   Goal 1   Goal Identifier HEP   Goal Description Patient will be independent in HEP and self management of symptoms   Target Date 02/13/23   Goal 2   Goal Identifier rolling   Goal Description Patient will roll and turn head  in bed without vertigo   Target Date 02/13/23   Goal 3   Goal Identifier bending/ exercising   Goal Description Patient will return to exercise including bending without vertigo.   Target Date 02/13/23   Total Evaluation Time   PT Sumeet Low Complexity Minutes (21493) 30   Therapy Certification   Certification date from 11/15/22   Certification date to 02/13/23   Medical Diagnosis Benign paroxysmal positional vertigo of right ear   Certification I certify the need for these services furnished under this plan of treatment and while under my care.  (Physician co-signature of this document indicates review and certification of the therapy plan).              Patient seen for one visit of PT.  She did not return to continue POC.  Discharge PT.  Donte Whitfield, PT

## 2022-11-30 ENCOUNTER — OFFICE VISIT (OUTPATIENT)
Dept: FAMILY MEDICINE | Facility: CLINIC | Age: 72
End: 2022-11-30
Payer: COMMERCIAL

## 2022-11-30 VITALS
HEART RATE: 69 BPM | BODY MASS INDEX: 26.86 KG/M2 | TEMPERATURE: 97.9 F | DIASTOLIC BLOOD PRESSURE: 64 MMHG | OXYGEN SATURATION: 98 % | WEIGHT: 156.5 LBS | SYSTOLIC BLOOD PRESSURE: 104 MMHG

## 2022-11-30 DIAGNOSIS — N31.8 HYPERTONICITY OF BLADDER: ICD-10-CM

## 2022-11-30 DIAGNOSIS — Z01.818 PRE-OP EXAM: Primary | ICD-10-CM

## 2022-11-30 DIAGNOSIS — M16.12 PRIMARY OSTEOARTHRITIS OF LEFT HIP: ICD-10-CM

## 2022-11-30 DIAGNOSIS — Z86.19 H/O COLD SORES: ICD-10-CM

## 2022-11-30 DIAGNOSIS — B35.1 ONYCHOMYCOSIS: ICD-10-CM

## 2022-11-30 DIAGNOSIS — E78.2 MIXED HYPERLIPIDEMIA: ICD-10-CM

## 2022-11-30 LAB
ALBUMIN SERPL BCG-MCNC: 4.2 G/DL (ref 3.5–5.2)
ALP SERPL-CCNC: 65 U/L (ref 35–104)
ALT SERPL W P-5'-P-CCNC: 12 U/L (ref 10–35)
ANION GAP SERPL CALCULATED.3IONS-SCNC: 10 MMOL/L (ref 7–15)
AST SERPL W P-5'-P-CCNC: 15 U/L (ref 10–35)
BILIRUB SERPL-MCNC: 0.7 MG/DL
BUN SERPL-MCNC: 16.7 MG/DL (ref 8–23)
CALCIUM SERPL-MCNC: 9.5 MG/DL (ref 8.8–10.2)
CHLORIDE SERPL-SCNC: 107 MMOL/L (ref 98–107)
CHOLEST SERPL-MCNC: 220 MG/DL
CREAT SERPL-MCNC: 0.87 MG/DL (ref 0.51–0.95)
DEPRECATED HCO3 PLAS-SCNC: 23 MMOL/L (ref 22–29)
GFR SERPL CREATININE-BSD FRML MDRD: 71 ML/MIN/1.73M2
GLUCOSE SERPL-MCNC: 100 MG/DL (ref 70–99)
HDLC SERPL-MCNC: 61 MG/DL
HGB BLD-MCNC: 14.1 G/DL (ref 11.7–15.7)
LDLC SERPL CALC-MCNC: 142 MG/DL
NONHDLC SERPL-MCNC: 159 MG/DL
POTASSIUM SERPL-SCNC: 3.8 MMOL/L (ref 3.4–5.3)
PROT SERPL-MCNC: 6.9 G/DL (ref 6.4–8.3)
SODIUM SERPL-SCNC: 140 MMOL/L (ref 136–145)
TRIGL SERPL-MCNC: 86 MG/DL

## 2022-11-30 PROCEDURE — 99214 OFFICE O/P EST MOD 30 MIN: CPT | Performed by: FAMILY MEDICINE

## 2022-11-30 PROCEDURE — 80061 LIPID PANEL: CPT | Performed by: FAMILY MEDICINE

## 2022-11-30 PROCEDURE — 36415 COLL VENOUS BLD VENIPUNCTURE: CPT | Performed by: FAMILY MEDICINE

## 2022-11-30 PROCEDURE — 80053 COMPREHEN METABOLIC PANEL: CPT | Performed by: FAMILY MEDICINE

## 2022-11-30 PROCEDURE — 85018 HEMOGLOBIN: CPT | Performed by: FAMILY MEDICINE

## 2022-11-30 RX ORDER — CICLOPIROX 80 MG/ML
SOLUTION TOPICAL
Qty: 6.6 ML | Refills: 3 | Status: SHIPPED | OUTPATIENT
Start: 2022-11-30 | End: 2023-09-01

## 2022-11-30 ASSESSMENT — PAIN SCALES - GENERAL: PAINLEVEL: NO PAIN (0)

## 2022-11-30 NOTE — PROGRESS NOTES
[facility]  [address]  [place] [address]  Phone: [phone]  Fax: [phone]  Primary Provider: Joy Wang  Pre-op Performing Provider: JOY WANG      PREOPERATIVE EVALUATION:  Today's date: 11/30/2022    Neela Bonner is a 72 year old female who presents for a preoperative evaluation.    Surgical Information:  Surgery/Procedure: Left hip replacement  Surgery Location: [facility]  Surgeon: Dr. Winchester  Surgery Date: 12/14/22  Time of Surgery: tbd  Where patient plans to recover: At home with family  Fax number for surgical facility:     Type of Anesthesia Anticipated: to be determined    Assessment & Plan     The proposed surgical procedure is considered INTERMEDIATE risk.    Pre-op exam  No contraindications or significant risk factors for planned procedure.  She has appointment for COVID test scheduled 2 days prior to surgery.  Advised avoidance of NSAIDs and aspirin 1 week prior to procedure.  We will check hemoglobin today.  - Asymptomatic COVID-19 Virus (Coronavirus) by PCR  - Hemoglobin; Future  - Hemoglobin    Primary osteoarthritis of left hip  Okay to proceed with planned procedure    Onychomycosis  We will treat with topical ciclopirox  - ciclopirox (PENLAC) 8 % external solution; Apply to adjacent skin and affected nails daily.  Remove with alcohol every 7 days, then repeat.    Mixed hyperlipidemia  Continue dietary and lifestyle changes.  Recheck lipids today.  If lipids are still elevated, will plan to start lipid-lowering medication.  She will not plan to start this until after her surgery however.  - Lipid panel reflex to direct LDL Fasting; Future  - Comprehensive metabolic panel (BMP + Alb, Alk Phos, ALT, AST, Total. Bili, TP); Future  - Lipid panel reflex to direct LDL Fasting  - Comprehensive metabolic panel (BMP + Alb, Alk Phos, ALT, AST, Total. Bili, TP)    Hyperactivity Of The Bladder  Stable on oxybutynin.  No change  in regimen prior to procedure    H/O cold sores  Continue valacyclovir as needed.  No change in regimen prior to procedure     :[id number]}                 RECOMMENDATION:  APPROVAL GIVEN to proceed with proposed procedure, without further diagnostic evaluation.                      Subjective     HPI related to upcoming procedure:  She has osteoarthritis of the left hip that is causing pain and impairment in mobility.  Has failed conservative treatment and is now planning to proceed with left hip replacement.     We reviewed her medications, allergies and health history.  She has history of hyperlipidemia.  Not on lipid-lowering medication at this time.  Working on lifestyle changes.  Has history of overactive bladder.  She is on oxybutynin.  She has history of cold sores.  Takes valacyclovir as needed.     Overall has been feeling quite good recently.  Review of systems is assessed.    She was recently seen at the walk-in clinic for vertigo.  Diagnosed with benign positional peripheral vertigo.  She did go to PT.  Symptoms have significantly improved but do still occur occasionally.  She also reports concern about thickening and discoloration of some of her toenails and is concerned about presence of a nail fungus.  Review of systems is otherwise negative.    Preop Questions 11/29/2022   1. Have you ever had a heart attack or stroke? No   2. Have you ever had surgery on your heart or blood vessels, such as a stent placement, a coronary artery bypass, or surgery on an artery in your head, neck, heart, or legs? No   3. Do you have chest pain with activity? No   4. Do you have a history of  heart failure? No   5. Do you currently have a cold, bronchitis or symptoms of other infection? No   6. Do you have a cough, shortness of breath, or wheezing? No   7. Do you or anyone in your family have previous history of blood clots? No   8. Do you or does anyone in your family have a serious bleeding problem such as prolonged  bleeding following surgeries or cuts? No   9. Have you ever had problems with anemia or been told to take iron pills? No   10. Have you had any abnormal blood loss such as black, tarry or bloody stools, or abnormal vaginal bleeding? No   11. Have you ever had a blood transfusion? No   12. Are you willing to have a blood transfusion if it is medically needed before, during, or after your surgery? Yes   13. Have you or any of your relatives ever had problems with anesthesia? No   14. Do you have sleep apnea, excessive snoring or daytime drowsiness? No   14a. Do you have a CPAP machine? No   15. Do you have any artifical heart valves or other implanted medical devices like a pacemaker, defibrillator, or continuous glucose monitor? No   16. Do you have artificial joints? No   17. Are you allergic to latex? No       Health Care Directive:  Patient does not have a Health Care Directive or Living Will: Discussed advance care planning with patient; information given to patient to review.    Preoperative Review of :   reviewed - controlled substances prescribed by other outside provider(s).          Review of Systems  Constitutional, neuro, ENT, endocrine, pulmonary, cardiac, gastrointestinal, genitourinary, musculoskeletal, integument and psychiatric systems are negative, except as otherwise noted.    Patient Active Problem List    Diagnosis Date Noted     Hyperlipidemia      Priority: Medium     No meds  History to date per PCP, Dr Nichole:   - 1997 nonfasting: T245/H65  - 9/4/09 [facility], fasting: T237/TG60/H55/L170; started fish oil 4g/day  - 2/9/10 fasting:T253/TG57/H54/L187; renewed efforts at lifestyle mod  - 9/22/10: T235/TG62/H60/L163, cont diet/exercise/wt loss  - 10/11/11 fasting: T253/TG55/H65/L177, declines meds, cont   diet/exercise/wt loss  - 11/30/12: T227/TG90/H59/L150, improved, cont lifestyle modifications &   wt loss efforts  - 12/30/13 fasting: T264/TG91/H64/L182; advised lifestyle modifications  &   wt loss  - 14 fasting: T245/TG65/H62/L170; advised lifestyle modifications &   wt loss  - 2/15/16 fasting: T266/TG70/H62/cL190; reviewed 2016, pt tryin TLC,   recheck lipids 3-6 mo         Menopause Has Occurred      Priority: Medium     Created by Conversion  Replacement Utility updated for latest IMO load         Overweight      Priority: Medium     - 09: BMI 26.6  - 3/17/14: BMI 27.6  - 1/15/15: BMI 28.4 (165#)  - 2016: BMI 28.2 (167#)         Cardiac risk counseling 02/15/2016     Priority: Medium     CV Risk Review 2/15/2016:    Risk factors = #1 (female >56 y/o)    Current/recent measurements:  - lipids 2/15/16 fasting: T266/TG70/H62/cL190  - glucose 2/15/16 fastin  - BP: __  - BMI: __ (__#)    [place] 10 year Heart Disease Risk: 7% (from UpToDate calculator)    Recommendations: goal LDL <160 and consider/offer start statin bc LDL   >190; TLC/weight loss         Patellar Chondromalacia      Priority: Medium     - 5/4/10 MRI: patellar chondromalacia w/ grade 4 changes & full thickness   defect  - [facility]: referred for PT then prn cortisone   vs synvisc injections  - 11/16/15 [facility]: possible R knee meniscal tear; MRI   ordered; f/u after MRI  - 11/23/15 [facility]: MRI R knee showed severe chondromalacia, no   meniscal tear; cortisone injection done R knee; referred for PT; if still   symptoms in 6 wks consider viscosupplementation injections         H/O cold sores      Priority: Medium     Oral Herpes. Takes Valtrex prn  - 1/15/2015: 1 outbreak in past year, renewed prescription//sds         Hyperactivity Of The Bladder      Priority: Medium     - uses prn oxybutinin (originally rxd by Dr Nomi Macario, [facility]; PCP/Dionisio rxing as of 1/15/2015)  - sometimes leaking with valsalva, sometimes urgency         Benign Adenoma Of The Large Intestine      Priority: Medium     - 14 [facility], repeat screening  colonoscopy: 2mm polyp ileocecal   valve (path=hyperplastic polyp); 4mm polyp splenic flexure (path=sessile   serrated adenoma, no overt dysplasia); 4mm polyp sigmoid colon (not   removed due to pt discomfort); repeat 5 yrs using MAC & propofol         History of being hospitalized      Priority: Medium     1958 for T&A, otherwise only for births         Localized Primary Osteoarthritis Of The Left Hip 06/27/2011     Priority: Medium     - 6/27/11 Dr Main, [facility]: mild degenerative changes per xray left   hip, advised stretching, prn analgesics, if worsening consider MRI          Past Medical History:   Diagnosis Date     Chickenpox 12 y/o     Dermatophytosis of nail 2008, 2009 2008. Recurrence 1/2009. Painful.  Treating with oral lamisil x3 months then recheck. Checked LFTs & Cr (wnl)     Esophageal reflux 8/15/2011    - 8/15/11: c/o chest discomfort, bloating sensation, +epigastric tender, rxd omeprazole 20mg XR/day - 10/2011: took PPI x1 mo, then stopped and symptoms have not returned     Fibrocystic breast      H/O exercise stress test 8/24/11    - [facility]24/11 [facility]: neg test, no EKG changes, no inducible ischemia, good exercise tolerance (105% of predicted)     Past Surgical History:   Procedure Laterality Date     COLONOSCOPY  3/10/04    - 3/10/04 [facility], screening colonoscpopy WNL, repeat 10 years     COLONOSCOPY W/ BIOPSIES  4/1/14    - 4/1/14 [facility], repeat screening colonoscopy: 2mm polyp ileocecal valve (path=hyperplastic polyp); 4mm polyp splenic flexure (path=sessile serrated adenoma, no overt dysplasia); 4mm polyp sigmoid colon (not removed due to pt discomfort); repeat 5 yrs using MAC & propofol     HC REMOVE TONSILS/ADENOIDS,<11 Y/O  1958 (9 y/o)    T&A     HYSTERECTOMY  1990's     OTHER SURGICAL HISTORY Right 10/26/05    MI REVISE MEDIAN N/CARPAL TUNNEL SURG- 10/26/05 Dr Duarte, [facility]: RIGHT carpal tunnel release     OTHER  SURGICAL HISTORY Left 6/2007    MD REVISE MEDIAN N/CARPAL TUNNEL SURG- 6/2007 Dr Sullivan, [facility]: L carpal tunnel release     OTHER SURGICAL HISTORY  7/9/07    MD SLING OPER STRES INCONTINENCE- 7/9/07 Dr Chin: TVT for stress incontinence     ZZC TOTAL ABDOM HYSTERECTOMY  5/1999    - 5/1999: ANNALEE for fibroids, benign, by Dr. Mitchlel ([facility]). Cervix taken. Does NOT need paps (per Dionisio 2/2010)     Current Outpatient Medications   Medication Sig Dispense Refill     acetaminophen (TYLENOL) 325 MG tablet [ACETAMINOPHEN (TYLENOL) 325 MG TABLET] Take 650 mg by mouth every 6 (six) hours as needed for pain. Takes 2 tablets every 8 hours if needed for pain       cholecalciferol, vitamin D3, (VITAMIN D3) 5,000 unit Tab [CHOLECALCIFEROL, VITAMIN D3, (VITAMIN D3) 5,000 UNIT TAB] Take by mouth daily.       ciclopirox (PENLAC) 8 % external solution Apply to adjacent skin and affected nails daily.  Remove with alcohol every 7 days, then repeat. 6.6 mL 3     ibuprofen (ADVIL,MOTRIN) 200 MG tablet [IBUPROFEN (ADVIL,MOTRIN) 200 MG TABLET] Take 400 mg by mouth as needed for pain.       meclizine (ANTIVERT) 25 MG tablet Take 1 tablet (25 mg) by mouth 3 times daily as needed for dizziness 10 tablet 0     oxybutynin (DITROPAN) 5 MG tablet TAKE 1 TABLET BY MOUTH EVERY DAY 90 tablet 3     valACYclovir (VALTREX) 500 MG tablet TAKE 2 TABS TWICE DAILY X 2 DAYS WITH OUTBREAK, THEN 1 TAB TWICE DAILY FOR UP TO 6 MORE DAYS 20 tablet 0       Allergies   Allergen Reactions     Erythromycin Base [Erythromycin] Hives        Social History     Tobacco Use     Smoking status: Never     Smokeless tobacco: Never   Substance Use Topics     Alcohol use: Yes     Alcohol/week: 1.0 standard drink     Comment: Alcoholic Drinks/day: social        History   Drug Use No         Objective     /64 (BP Location: Right arm, Cuff Size: Adult Regular)   Pulse 69   Temp 97.9  F (36.6  C) (Temporal)   Wt 71 kg (156 lb 8 oz)   SpO2  98%   BMI 26.86 kg/m      Physical Exam    GENERAL APPEARANCE: healthy, alert and no distress     EYES: EOMI, PERRL     HENT: ear canals and TM's normal     RESP: lungs clear to auscultation - no rales, rhonchi or wheezes     CV: regular rates and rhythm, normal S1 S2, no S3 or S4 and no murmur, click or rub     ABDOMEN:  soft, nontender, no HSM or masses and bowel sounds normal     MS: extremities normal- no gross deformities noted, no evidence of inflammation in joints, FROM in all extremities.     SKIN: There is some thickening and yellowish discoloration of toenails     NEURO: Normal strength and tone, sensory exam grossly normal, mentation intact and speech normal     PSYCH: mentation appears normal. and affect normal/bright     LYMPHATICS: No cervical adenopathy    Recent Labs   Lab Test 06/02/22  1002 05/10/21  0854   HGB 14.7 15.1    140   POTASSIUM 4.4 4.4   CR 0.78 0.84        Diagnostics:  Labs pending at this time.  Results will be reviewed when available.   No EKG this visit, completed in the last 90 days.    Revised Cardiac Risk Index (RCRI):  The patient has the following serious cardiovascular risks for perioperative complications:   - No serious cardiac risks = 0 points     RCRI Interpretation: 0 points: Class I (very low risk - 0.4% complication rate)           Signed Electronically by: Joy Schreiber MD  Copy of this evaluation report is provided to requesting physician.

## 2022-12-07 ENCOUNTER — MEDICAL CORRESPONDENCE (OUTPATIENT)
Dept: HEALTH INFORMATION MANAGEMENT | Facility: CLINIC | Age: 72
End: 2022-12-07

## 2022-12-08 ENCOUNTER — LAB (OUTPATIENT)
Dept: LAB | Facility: CLINIC | Age: 72
End: 2022-12-08
Payer: COMMERCIAL

## 2022-12-08 DIAGNOSIS — Z01.818 PREOP EXAMINATION: Primary | ICD-10-CM

## 2022-12-08 DIAGNOSIS — Z00.00 ROUTINE GENERAL MEDICAL EXAMINATION AT A HEALTH CARE FACILITY: ICD-10-CM

## 2022-12-08 LAB
ERYTHROCYTE [DISTWIDTH] IN BLOOD BY AUTOMATED COUNT: 12.1 % (ref 10–15)
HCT VFR BLD AUTO: 42.2 % (ref 35–47)
HGB BLD-MCNC: 14.1 G/DL (ref 11.7–15.7)
MCH RBC QN AUTO: 30.3 PG (ref 26.5–33)
MCHC RBC AUTO-ENTMCNC: 33.4 G/DL (ref 31.5–36.5)
MCV RBC AUTO: 91 FL (ref 78–100)
PLATELET # BLD AUTO: 264 10E3/UL (ref 150–450)
RBC # BLD AUTO: 4.65 10E6/UL (ref 3.8–5.2)
WBC # BLD AUTO: 5.2 10E3/UL (ref 4–11)

## 2022-12-08 PROCEDURE — 85027 COMPLETE CBC AUTOMATED: CPT

## 2022-12-08 PROCEDURE — 36415 COLL VENOUS BLD VENIPUNCTURE: CPT

## 2022-12-12 ENCOUNTER — TRANSFERRED RECORDS (OUTPATIENT)
Dept: HEALTH INFORMATION MANAGEMENT | Facility: CLINIC | Age: 72
End: 2022-12-12

## 2022-12-14 ENCOUNTER — TRANSFERRED RECORDS (OUTPATIENT)
Dept: HEALTH INFORMATION MANAGEMENT | Facility: CLINIC | Age: 72
End: 2022-12-14

## 2023-01-03 ENCOUNTER — TRANSFERRED RECORDS (OUTPATIENT)
Dept: HEALTH INFORMATION MANAGEMENT | Facility: CLINIC | Age: 73
End: 2023-01-03

## 2023-01-26 ENCOUNTER — TRANSFERRED RECORDS (OUTPATIENT)
Dept: HEALTH INFORMATION MANAGEMENT | Facility: CLINIC | Age: 73
End: 2023-01-26

## 2023-02-16 NOTE — ADDENDUM NOTE
Encounter addended by: Donte Whitfield, PT on: 2/16/2023 10:21 AM   Actions taken: Clinical Note Signed, Episode resolved

## 2023-03-02 ENCOUNTER — TRANSFERRED RECORDS (OUTPATIENT)
Dept: HEALTH INFORMATION MANAGEMENT | Facility: CLINIC | Age: 73
End: 2023-03-02
Payer: COMMERCIAL

## 2023-05-17 ENCOUNTER — TRANSFERRED RECORDS (OUTPATIENT)
Dept: HEALTH INFORMATION MANAGEMENT | Facility: CLINIC | Age: 73
End: 2023-05-17
Payer: COMMERCIAL

## 2023-06-01 ENCOUNTER — ANCILLARY PROCEDURE (OUTPATIENT)
Dept: MAMMOGRAPHY | Facility: CLINIC | Age: 73
End: 2023-06-01
Attending: FAMILY MEDICINE
Payer: COMMERCIAL

## 2023-06-01 DIAGNOSIS — Z12.31 VISIT FOR SCREENING MAMMOGRAM: ICD-10-CM

## 2023-06-01 PROCEDURE — 77067 SCR MAMMO BI INCL CAD: CPT

## 2023-06-02 ENCOUNTER — OFFICE VISIT (OUTPATIENT)
Dept: FAMILY MEDICINE | Facility: CLINIC | Age: 73
End: 2023-06-02
Attending: FAMILY MEDICINE
Payer: COMMERCIAL

## 2023-06-02 VITALS
HEIGHT: 64 IN | SYSTOLIC BLOOD PRESSURE: 118 MMHG | OXYGEN SATURATION: 97 % | BODY MASS INDEX: 26.91 KG/M2 | WEIGHT: 157.6 LBS | HEART RATE: 57 BPM | TEMPERATURE: 98.2 F | DIASTOLIC BLOOD PRESSURE: 71 MMHG

## 2023-06-02 DIAGNOSIS — N31.8 HYPERTONICITY OF BLADDER: Chronic | ICD-10-CM

## 2023-06-02 DIAGNOSIS — Z86.19 H/O COLD SORES: ICD-10-CM

## 2023-06-02 DIAGNOSIS — Z00.00 ENCOUNTER FOR MEDICARE ANNUAL WELLNESS EXAM: Primary | ICD-10-CM

## 2023-06-02 DIAGNOSIS — E78.2 MIXED HYPERLIPIDEMIA: ICD-10-CM

## 2023-06-02 DIAGNOSIS — Z79.899 HIGH RISK MEDICATION USE: ICD-10-CM

## 2023-06-02 LAB
ALBUMIN SERPL BCG-MCNC: 4.3 G/DL (ref 3.5–5.2)
ALP SERPL-CCNC: 72 U/L (ref 35–104)
ALT SERPL W P-5'-P-CCNC: 14 U/L (ref 10–35)
ANION GAP SERPL CALCULATED.3IONS-SCNC: 10 MMOL/L (ref 7–15)
AST SERPL W P-5'-P-CCNC: 17 U/L (ref 10–35)
BILIRUB SERPL-MCNC: 0.5 MG/DL
BUN SERPL-MCNC: 15.7 MG/DL (ref 8–23)
CALCIUM SERPL-MCNC: 9.3 MG/DL (ref 8.8–10.2)
CHLORIDE SERPL-SCNC: 106 MMOL/L (ref 98–107)
CHOLEST SERPL-MCNC: 240 MG/DL
CREAT SERPL-MCNC: 0.82 MG/DL (ref 0.51–0.95)
DEPRECATED HCO3 PLAS-SCNC: 25 MMOL/L (ref 22–29)
GFR SERPL CREATININE-BSD FRML MDRD: 76 ML/MIN/1.73M2
GLUCOSE SERPL-MCNC: 94 MG/DL (ref 70–99)
HDLC SERPL-MCNC: 66 MG/DL
HGB BLD-MCNC: 14.5 G/DL (ref 11.7–15.7)
LDLC SERPL CALC-MCNC: 157 MG/DL
NONHDLC SERPL-MCNC: 174 MG/DL
POTASSIUM SERPL-SCNC: 3.9 MMOL/L (ref 3.4–5.3)
PROT SERPL-MCNC: 6.9 G/DL (ref 6.4–8.3)
SODIUM SERPL-SCNC: 141 MMOL/L (ref 136–145)
TRIGL SERPL-MCNC: 83 MG/DL

## 2023-06-02 PROCEDURE — 82306 VITAMIN D 25 HYDROXY: CPT | Performed by: FAMILY MEDICINE

## 2023-06-02 PROCEDURE — 80053 COMPREHEN METABOLIC PANEL: CPT | Performed by: FAMILY MEDICINE

## 2023-06-02 PROCEDURE — 99214 OFFICE O/P EST MOD 30 MIN: CPT | Mod: 25 | Performed by: FAMILY MEDICINE

## 2023-06-02 PROCEDURE — 36415 COLL VENOUS BLD VENIPUNCTURE: CPT | Performed by: FAMILY MEDICINE

## 2023-06-02 PROCEDURE — 80061 LIPID PANEL: CPT | Performed by: FAMILY MEDICINE

## 2023-06-02 PROCEDURE — 85018 HEMOGLOBIN: CPT | Performed by: FAMILY MEDICINE

## 2023-06-02 PROCEDURE — G0439 PPPS, SUBSEQ VISIT: HCPCS | Performed by: FAMILY MEDICINE

## 2023-06-02 ASSESSMENT — ENCOUNTER SYMPTOMS
DYSURIA: 0
ARTHRALGIAS: 0
NAUSEA: 0
MYALGIAS: 0
NERVOUS/ANXIOUS: 0
COUGH: 0
HEARTBURN: 0
BREAST MASS: 0
EYE PAIN: 0
FEVER: 0
SHORTNESS OF BREATH: 0
PALPITATIONS: 0
FREQUENCY: 0
DIARRHEA: 0
CHILLS: 0
JOINT SWELLING: 0
PARESTHESIAS: 0
CONSTIPATION: 0
HEMATOCHEZIA: 0
WEAKNESS: 0
HEADACHES: 0
HEMATURIA: 0
SORE THROAT: 0
ABDOMINAL PAIN: 0
DIZZINESS: 0

## 2023-06-02 ASSESSMENT — ACTIVITIES OF DAILY LIVING (ADL): CURRENT_FUNCTION: NO ASSISTANCE NEEDED

## 2023-06-02 ASSESSMENT — PAIN SCALES - GENERAL: PAINLEVEL: NO PAIN (0)

## 2023-06-02 NOTE — PATIENT INSTRUCTIONS
Patient Education   Personalized Prevention Plan  You are due for the preventive services outlined below.  Your care team is available to assist you in scheduling these services.  If you have already completed any of these items, please share that information with your care team to update in your medical record.  Health Maintenance Due   Topic Date Due     COVID-19 Vaccine (6 - Moderna series) 01/10/2023     Annual Wellness Visit  06/02/2023

## 2023-06-02 NOTE — PROGRESS NOTES
SUBJECTIVE:   Neela is a 73 year old who presents for Preventive Visit.      6/2/2023     8:43 AM   Additional Questions   Roomed by Tika     Are you in the first 12 months of your Medicare coverage?  No    We reviewed her health history.  We reviewed and updated medications and allergies as well as family and social history. Her mother recently passed away at age 95.  She has been traveling back and forth to [place] to prepare her mother's home for sale.   She gets regular vision and dental exams.  She exercises by walking and doing Pilates.  She has history of osteoarthritis of the left hip and both knees.  Underwent left hip replacement in December 2022.  The hip has done well.  Recently was seen by Ortho and had a injection for bursitis of the hip.       She tries to follow healthy diet and get adequate calcium and vitamin D intake.  She has history of vitamin D deficiency and remains on a vitamin D supplement.  She has history of hyperlipidemia that is not currently being treated with medications.  Has been doing a CBD oil supplement for past couple of months.  Wants to see if this will help with cholesterol.  Has noticed she is sleeping better with using the supplement.  She has history of hyperactivity of the bladder as well as stress incontinence.  Takes oxybutynin.    This medication is working well.  She also did pelvic floor physical therapy in the past and that helped improve her symptoms.  She has history of cold sores and uses valacyclovir as needed.  She is up-to-date on mammogram.  She had colonoscopy in April 2022.  5 year follow-up recommended.    .  Review of systems is assessed and is otherwise negative.  No other questions today      Healthy Habits:     In general, how would you rate your overall health?  Good    Frequency of exercise:  2-3 days/week    Duration of exercise:  30-45 minutes    Do you usually eat at least 4 servings of fruit and vegetables a day, include whole grains    & fiber and  "avoid regularly eating high fat or \"junk\" foods?  Yes    Taking medications regularly:  Yes    Medication side effects:  None    Ability to successfully perform activities of daily living:  No assistance needed    Home Safety:  No safety concerns identified    Hearing Impairment:  No hearing concerns    In the past 6 months, have you been bothered by leaking of urine?  No    In general, how would you rate your overall mental or emotional health?  Good      PHQ-2 Total Score: 0    Additional concerns today:  No        Have you ever done Advance Care Planning? (For example, a Health Directive, POLST, or a discussion with a medical provider or your loved ones about your wishes): Yes, patient states has an Advance Care Planning document and will bring a copy to the clinic.       Fall risk  Fallen 2 or more times in the past year?: No  Any fall with injury in the past year?: No    Cognitive Screening   1) Repeat 3 items (Leader, Season, Table)    2) Clock draw: NORMAL  3) 3 item recall: Recalls 3 objects  Results: 3 items recalled: COGNITIVE IMPAIRMENT LESS LIKELY    Mini-CogTM Copyright [employer]. Licensed by the author for use in [facility]; reprinted with permission (radha@[facility]). All rights reserved.      Do you have sleep apnea, excessive snoring or daytime drowsiness?: no    Reviewed and updated as needed this visit by clinical staff   Tobacco  Allergies  Meds  Problems             Reviewed and updated as needed this visit by Provider    Allergies  Meds  Problems            Social History     Tobacco Use     Smoking status: Never     Smokeless tobacco: Never   Vaping Use     Vaping status: Not on file   Substance Use Topics     Alcohol use: Yes     Alcohol/week: 1.0 standard drink of alcohol     Comment: Alcoholic Drinks/day: social              6/2/2023     8:07 AM   Alcohol Use   Prescreen: >3 drinks/day or >7 drinks/week? No          View : No data to display.              Do you have a " current opioid prescription? No  Do you use any other controlled substances or medications that are not prescribed by a provider? None              Current providers sharing in care for this patient include:   Patient Care Team:  Joy Schreiber MD as PCP - General  Joy Schreiber MD as Assigned PCP    The following health maintenance items are reviewed in Epic and correct as of today:  Health Maintenance   Topic Date Due     COVID-19 Vaccine (6 - Moderna series) 01/10/2023     MEDICARE ANNUAL WELLNESS VISIT  06/02/2024     ANNUAL REVIEW OF HM ORDERS  06/02/2024     FALL RISK ASSESSMENT  06/02/2024     MAMMO SCREENING  06/01/2025     COLORECTAL CANCER SCREENING  04/13/2027     DEXA  11/07/2027     LIPID  11/30/2027     ADVANCE CARE PLANNING  06/02/2028     DTAP/TDAP/TD IMMUNIZATION (3 - Td or Tdap) 03/22/2029     HEPATITIS C SCREENING  Completed     PHQ-2 (once per calendar year)  Completed     INFLUENZA VACCINE  Completed     Pneumococcal Vaccine: 65+ Years  Completed     ZOSTER IMMUNIZATION  Completed     IPV IMMUNIZATION  Aged Out     MENINGITIS IMMUNIZATION  Aged Out     Current Outpatient Medications   Medication Sig Dispense Refill     acetaminophen (TYLENOL) 325 MG tablet [ACETAMINOPHEN (TYLENOL) 325 MG TABLET] Take 650 mg by mouth every 6 (six) hours as needed for pain. Takes 2 tablets every 8 hours if needed for pain       cholecalciferol, vitamin D3, (VITAMIN D3) 5,000 unit Tab [CHOLECALCIFEROL, VITAMIN D3, (VITAMIN D3) 5,000 UNIT TAB] Take by mouth daily.       ciclopirox (PENLAC) 8 % external solution Apply to adjacent skin and affected nails daily.  Remove with alcohol every 7 days, then repeat. 6.6 mL 3     ibuprofen (ADVIL,MOTRIN) 200 MG tablet [IBUPROFEN (ADVIL,MOTRIN) 200 MG TABLET] Take 400 mg by mouth as needed for pain.       oxybutynin (DITROPAN) 5 MG tablet TAKE 1 TABLET BY MOUTH EVERY DAY 90 tablet 3     valACYclovir (VALTREX) 500 MG tablet TAKE 2 TABS TWICE DAILY X 2 DAYS WITH OUTBREAK,  "THEN 1 TAB TWICE DAILY FOR UP TO 6 MORE DAYS 20 tablet 0             Review of Systems   Constitutional: Negative for chills and fever.   HENT: Negative for congestion, ear pain, hearing loss and sore throat.    Eyes: Negative for pain and visual disturbance.   Respiratory: Negative for cough and shortness of breath.    Cardiovascular: Negative for chest pain, palpitations and peripheral edema.   Gastrointestinal: Negative for abdominal pain, constipation, diarrhea, heartburn, hematochezia and nausea.   Breasts:  Negative for tenderness, breast mass and discharge.   Genitourinary: Negative for dysuria, frequency, genital sores, hematuria, pelvic pain, urgency, vaginal bleeding and vaginal discharge.   Musculoskeletal: Negative for arthralgias, joint swelling and myalgias.   Skin: Negative for rash.   Neurological: Negative for dizziness, weakness, headaches and paresthesias.   Psychiatric/Behavioral: Negative for mood changes. The patient is not nervous/anxious.          OBJECTIVE:   /71 (BP Location: Right arm, Patient Position: Sitting, Cuff Size: Adult Regular)   Pulse 57   Temp 98.2  F (36.8  C) (Oral)   Ht 1.627 m (5' 4.06\")   Wt 71.5 kg (157 lb 9.6 oz)   SpO2 97%   BMI 27.01 kg/m   Estimated body mass index is 27.01 kg/m  as calculated from the following:    Height as of this encounter: 1.627 m (5' 4.06\").    Weight as of this encounter: 71.5 kg (157 lb 9.6 oz).  Physical Exam  GENERAL APPEARANCE: healthy, alert and no distress  EYES: Eyes grossly normal to inspection, PERRL and conjunctivae and sclerae normal  HENT: ear canals and TM's normal, nose and mouth without ulcers or lesions, oropharynx clear and oral mucous membranes moist  RESP: lungs clear to auscultation - no rales, rhonchi or wheezes  BREAST: normal without masses, tenderness or nipple discharge and no palpable axillary masses or adenopathy  CV: regular rate and rhythm, normal S1 S2, no S3 or S4, no murmur, click or rub, no " peripheral edema and peripheral pulses strong  ABDOMEN: soft, nontender, no hepatosplenomegaly, no masses and bowel sounds normal  MS: no musculoskeletal defects are noted and gait is age appropriate without ataxia  SKIN: no suspicious lesions or rashes  NEURO: Normal strength and tone, sensory exam grossly normal, mentation intact and speech normal  PSYCH: mentation appears normal and affect normal/bright        ASSESSMENT / PLAN:   Neela was seen today for physical.    Diagnoses and all orders for this visit:    Encounter for Medicare annual wellness exam  -     PRIMARY CARE FOLLOW-UP SCHEDULING; Future  -     Hemoglobin; Future  -     Hemoglobin  Mammogram and colonoscopy up-to-date.  Bone density scan up-to-date.  Continue with regular exercise, healthy diet and adequate calcium and vitamin D intake.  Check fasting lipids, hemoglobin and CMP.  Return to clinic in 1 year for annual physical    Mixed hyperlipidemia  -     Lipid panel reflex to direct LDL Fasting; Future  -     Comprehensive metabolic panel (BMP + Alb, Alk Phos, ALT, AST, Total. Bili, TP); Future  -     Lipid panel reflex to direct LDL Fasting  -     Comprehensive metabolic panel (BMP + Alb, Alk Phos, ALT, AST, Total. Bili, TP)  Continue with dietary modifications and healthy lifestyle changes.  Recently started a CBD oil supplement.  Check lipids and comprehensive metabolic panel today    High risk medication use  -     Vitamin D Deficiency; Future  -     Vitamin D Deficiency    H/O cold sores  Continue valacyclovir as needed    Hyperactivity Of The Bladder  Stable on current dose of oxybutynin.  This will be continued    Other orders  -     PRIMARY CARE FOLLOW-UP SCHEDULING  -     REVIEW OF HEALTH MAINTENANCE PROTOCOL ORDERS              COUNSELING:  Reviewed preventive health counseling, as reflected in patient instructions      BMI:   Estimated body mass index is 27.01 kg/m  as calculated from the following:    Height as of this encounter:  "1.627 m (5' 4.06\").    Weight as of this encounter: 71.5 kg (157 lb 9.6 oz).         She reports that she has never smoked. She has never used smokeless tobacco.      Appropriate preventive services were discussed with this patient, including applicable screening as appropriate for cardiovascular disease, diabetes, osteopenia/osteoporosis, and glaucoma.  As appropriate for age/gender, discussed screening for colorectal cancer, prostate cancer, breast cancer, and cervical cancer. Checklist reviewing preventive services available has been given to the patient.    Reviewed patients plan of care and provided an AVS. The Basic Care Plan (routine screening as documented in Health Maintenance) for Neela meets the Care Plan requirement. This Care Plan has been established and reviewed with the Patient.          Joy Schreiber MD  [facility]    Identified Health Risks:    I have reviewed Opioid Use Disorder and Substance Use Disorder risk factors and made any needed referrals.     "

## 2023-06-05 LAB — DEPRECATED CALCIDIOL+CALCIFEROL SERPL-MC: 89 UG/L (ref 20–75)

## 2023-08-15 ENCOUNTER — TRANSFERRED RECORDS (OUTPATIENT)
Dept: HEALTH INFORMATION MANAGEMENT | Facility: CLINIC | Age: 73
End: 2023-08-15
Payer: COMMERCIAL

## 2023-08-31 ENCOUNTER — TRANSFERRED RECORDS (OUTPATIENT)
Dept: HEALTH INFORMATION MANAGEMENT | Facility: CLINIC | Age: 73
End: 2023-08-31
Payer: COMMERCIAL

## 2023-09-01 ENCOUNTER — OFFICE VISIT (OUTPATIENT)
Dept: FAMILY MEDICINE | Facility: CLINIC | Age: 73
End: 2023-09-01
Payer: COMMERCIAL

## 2023-09-01 VITALS
WEIGHT: 158.3 LBS | HEIGHT: 64 IN | DIASTOLIC BLOOD PRESSURE: 70 MMHG | HEART RATE: 59 BPM | TEMPERATURE: 98.1 F | BODY MASS INDEX: 27.02 KG/M2 | SYSTOLIC BLOOD PRESSURE: 110 MMHG | RESPIRATION RATE: 16 BRPM | OXYGEN SATURATION: 95 %

## 2023-09-01 DIAGNOSIS — E78.5 HYPERLIPIDEMIA, UNSPECIFIED HYPERLIPIDEMIA TYPE: Primary | Chronic | ICD-10-CM

## 2023-09-01 DIAGNOSIS — N31.8 OTHER NEUROMUSCULAR DYSFUNCTION OF BLADDER: ICD-10-CM

## 2023-09-01 DIAGNOSIS — R93.89 ABNORMAL X-RAY: ICD-10-CM

## 2023-09-01 LAB
CHOLEST SERPL-MCNC: 233 MG/DL
HDLC SERPL-MCNC: 67 MG/DL
LDLC SERPL CALC-MCNC: 151 MG/DL
NONHDLC SERPL-MCNC: 166 MG/DL
TRIGL SERPL-MCNC: 76 MG/DL

## 2023-09-01 PROCEDURE — 99214 OFFICE O/P EST MOD 30 MIN: CPT | Performed by: NURSE PRACTITIONER

## 2023-09-01 PROCEDURE — 36415 COLL VENOUS BLD VENIPUNCTURE: CPT | Performed by: NURSE PRACTITIONER

## 2023-09-01 PROCEDURE — 80061 LIPID PANEL: CPT | Performed by: NURSE PRACTITIONER

## 2023-09-01 RX ORDER — OXYBUTYNIN CHLORIDE 5 MG/1
5 TABLET ORAL DAILY
Qty: 90 TABLET | Refills: 3 | Status: SHIPPED | OUTPATIENT
Start: 2023-09-01 | End: 2024-09-23

## 2023-09-01 ASSESSMENT — PAIN SCALES - GENERAL: PAINLEVEL: NO PAIN (0)

## 2023-09-01 NOTE — PATIENT INSTRUCTIONS
I went ahead and ordered the CT scan of the abdomen.  Scheduling information is highlighted in your paperwork.    We are rechecking cholesterol levels today.    You do meet the criteria for starting a statin medication to lower cholesterol to decrease your cardiovascular disease risk.    If hesitant on starting a statin medicine, we could do that CT coronary calcium scan to see if you have any plaque buildup yet.  That can help also to guide your decision on whether or not to start a statin.    If interested in medicine or that calcium scan, let me know by calling or sending a [facility] message.

## 2023-09-01 NOTE — PROGRESS NOTES
"Assessment & Plan     ICD-10-CM    1. Hyperlipidemia, unspecified hyperlipidemia type  E78.5 Lipid panel      2. Other neuromuscular dysfunction of bladder  N31.8 oxyBUTYnin (DITROPAN) 5 MG tablet      3. Abnormal x-ray  R93.89 CT Abdomen Pelvis w Contrast     CANCELED: CT Abdomen Pelvis w Contrast          Reviewed risks and benefits of statin therapy.  She is going to consider this.  Reviewed getting CT coronary calcium scan.  She is going to consider this as well.  CT abdomen to follow-up on abnormal calcification that was incidentally found on lumbar x-ray.  Continue same oxybutynin.    Reviewed lumbar x-ray x1, family medicine note x1, lipids x3, orthopedic note x1    Subjective       The 10-year ASCVD risk score (Oral EDMONDSON, et al., 2019) is: 8.1%    Values used to calculate the score:      Age: 73 years      Sex: Female      Is Non- : No      Diabetic: No      Tobacco smoker: No      Systolic Blood Pressure: 110 mmHg      Is BP treated: No      HDL Cholesterol: 66 mg/dL      Total Cholesterol: 240 mg/dL      HPI     Back pain  Started about a month ago.  Got up off the couch and it seized up on her.  Did have a lumbar xr. No MRI yet. Didn't need physical therapy.  She has not been exercising like usual and plans to restart Pilates soon.    Did have a lumbar x-ray which showed \"prominent calcific changes in the right lower quadrant which has a multifaceted appearance.  Findings could be due to gallstones and a low-lying gallbladder versus other process including some type of bowel related calcification.  Ultrasound or CT abdomen pelvis could be considered and further evaluation\" no GI issues.    History of overactive bladder.  Continues with oxybutynin.  Well-tolerated.    History of hyperlipidemia.  Is hesitant on statin therapy but is considering.  Would like to recheck lipids today.  Minimizes red meat consumption.    Review of Systems - negative except for what's listed in the HPI    " "  Objective    /70 (BP Location: Right arm, Patient Position: Sitting, Cuff Size: Adult Regular)   Pulse 59   Temp 98.1  F (36.7  C) (Oral)   Resp 16   Ht 1.626 m (5' 4\")   Wt 71.8 kg (158 lb 4.8 oz)   SpO2 95%   BMI 27.17 kg/m    Physical Exam   General appearance - alert, well appearing, and in no distress  Mental status - alert, oriented to person, place, and time  Lymphatics - no palpable lymphadenopathy  Chest - clear to auscultation, no wheezes, rales or rhonchi, symmetric air entry  Heart - normal rate and regular rhythm, S1 and S2 normal, no murmurs noted  Abdomen - soft, nontender, nondistended, no masses or organomegaly  Neurological - alert, oriented, normal speech, no focal findings or movement disorder noted, neck supple without rigidity, cranial nerves II through XII intact, motor and sensory grossly normal bilaterally, normal muscle tone, no tremors, strength 5/5  Musculoskeletal - no joint tenderness, deformity or swelling  Extremities - peripheral pulses normal, no peripheral edema  Skin - normal coloration and turgor.    Eduardo Jonas, CNP    This note has been dictated using voice recognition software. Any grammatical or context distortions are unintentional and inherent to the software.      "

## 2023-09-05 ENCOUNTER — HOSPITAL ENCOUNTER (OUTPATIENT)
Dept: CT IMAGING | Facility: CLINIC | Age: 73
Discharge: HOME | End: 2023-09-05
Attending: NURSE PRACTITIONER | Admitting: NURSE PRACTITIONER
Payer: COMMERCIAL

## 2023-09-05 DIAGNOSIS — R93.89 ABNORMAL X-RAY: ICD-10-CM

## 2023-09-05 LAB
CREAT BLD-MCNC: 0.9 MG/DL (ref 0.6–1.1)
EGFRCR SERPLBLD CKD-EPI 2021: >60 ML/MIN/1.73M2

## 2023-09-05 PROCEDURE — 82565 ASSAY OF CREATININE: CPT

## 2023-09-05 PROCEDURE — 250N000011 HC RX IP 250 OP 636: Mod: JZ | Performed by: NURSE PRACTITIONER

## 2023-09-05 PROCEDURE — 74177 CT ABD & PELVIS W/CONTRAST: CPT

## 2023-09-05 RX ORDER — IOPAMIDOL 755 MG/ML
100 INJECTION, SOLUTION INTRAVASCULAR ONCE
Status: COMPLETED | OUTPATIENT
Start: 2023-09-05 | End: 2023-09-05

## 2023-09-05 RX ADMIN — IOPAMIDOL 100 ML: 755 INJECTION, SOLUTION INTRAVENOUS at 19:39

## 2023-09-11 ENCOUNTER — TRANSFERRED RECORDS (OUTPATIENT)
Dept: HEALTH INFORMATION MANAGEMENT | Facility: CLINIC | Age: 73
End: 2023-09-11
Payer: COMMERCIAL

## 2023-09-15 ENCOUNTER — TELEPHONE (OUTPATIENT)
Dept: FAMILY MEDICINE | Facility: CLINIC | Age: 73
End: 2023-09-15
Payer: COMMERCIAL

## 2023-09-15 DIAGNOSIS — E78.5 HYPERLIPIDEMIA, UNSPECIFIED HYPERLIPIDEMIA TYPE: Primary | ICD-10-CM

## 2023-09-15 NOTE — TELEPHONE ENCOUNTER
Order/Referral Request    Who is requesting: patient    Orders being requested: CT coronary calcium scan    Reason service is needed/diagnosis: discussed at visit, 9/1/23. Pt was going to call back if she wanted to persue this.    When are orders needed by:     Has this been discussed with Provider: Yes    Does patient have a preference on a Group/Provider/Facility? Provider preference    Does patient have an appointment scheduled?: No    Where to send orders:  EP[facility]- no call back needed if order placed    Could we send this information to you in [facility] or would you prefer to receive a phone call?:

## 2023-09-24 ENCOUNTER — ANCILLARY ORDERS (OUTPATIENT)
Dept: FAMILY MEDICINE | Facility: CLINIC | Age: 73
End: 2023-09-24

## 2023-09-24 ENCOUNTER — HOSPITAL ENCOUNTER (OUTPATIENT)
Dept: CT IMAGING | Facility: CLINIC | Age: 73
Discharge: HOME | End: 2023-09-24
Attending: NURSE PRACTITIONER | Admitting: NURSE PRACTITIONER
Payer: COMMERCIAL

## 2023-09-24 DIAGNOSIS — E78.5 HYPERLIPIDEMIA, UNSPECIFIED HYPERLIPIDEMIA TYPE: Primary | ICD-10-CM

## 2023-09-24 DIAGNOSIS — E78.5 HYPERLIPIDEMIA, UNSPECIFIED HYPERLIPIDEMIA TYPE: ICD-10-CM

## 2023-09-24 PROCEDURE — 75571 CT HRT W/O DYE W/CA TEST: CPT

## 2023-09-24 PROCEDURE — 75571 CT HRT W/O DYE W/CA TEST: CPT | Mod: 26 | Performed by: INTERNAL MEDICINE

## 2023-09-25 LAB
CV CALCIUM SCORE AGATSTON LM: 0
CV CALCIUM SCORING AGATSON LAD: 0
CV CALCIUM SCORING AGATSTON CX: 0
CV CALCIUM SCORING AGATSTON RCA: 0
CV CALCIUM SCORING AGATSTON TOTAL: 0

## 2023-10-23 ENCOUNTER — TRANSFERRED RECORDS (OUTPATIENT)
Dept: HEALTH INFORMATION MANAGEMENT | Facility: CLINIC | Age: 73
End: 2023-10-23
Payer: COMMERCIAL

## 2023-10-25 ENCOUNTER — TRANSFERRED RECORDS (OUTPATIENT)
Dept: HEALTH INFORMATION MANAGEMENT | Facility: CLINIC | Age: 73
End: 2023-10-25
Payer: COMMERCIAL

## 2023-11-21 ENCOUNTER — PATIENT OUTREACH (OUTPATIENT)
Dept: GASTROENTEROLOGY | Facility: CLINIC | Age: 73
End: 2023-11-21
Payer: COMMERCIAL

## 2023-12-05 ENCOUNTER — TRANSFERRED RECORDS (OUTPATIENT)
Dept: HEALTH INFORMATION MANAGEMENT | Facility: CLINIC | Age: 73
End: 2023-12-05
Payer: COMMERCIAL

## 2023-12-14 ENCOUNTER — E-VISIT (OUTPATIENT)
Dept: URGENT CARE | Facility: CLINIC | Age: 73
End: 2023-12-14
Payer: COMMERCIAL

## 2023-12-14 DIAGNOSIS — N39.0 ACUTE UTI (URINARY TRACT INFECTION): Primary | ICD-10-CM

## 2023-12-14 PROCEDURE — 99421 OL DIG E/M SVC 5-10 MIN: CPT | Performed by: EMERGENCY MEDICINE

## 2023-12-14 RX ORDER — NITROFURANTOIN 25; 75 MG/1; MG/1
100 CAPSULE ORAL 2 TIMES DAILY
Qty: 10 CAPSULE | Refills: 0 | Status: SHIPPED | OUTPATIENT
Start: 2023-12-14 | End: 2023-12-19

## 2023-12-14 NOTE — PATIENT INSTRUCTIONS
Dear Neela Bonner    After reviewing your responses, I've been able to diagnose you with a urinary tract infection, which is a common infection of the bladder with bacteria.  This is not a sexually transmitted infection, though urinating immediately after intercourse can help prevent infections.  Drinking lots of fluids is also helpful to clear your current infection and prevent the next one.      I have sent a prescription for antibiotics to your pharmacy to treat this infection.    It is important that you take all of your prescribed medication even if your symptoms are improving after a few doses.  Taking all of your medicine helps prevent the symptoms from returning.     If your symptoms worsen, you develop pain in your back or stomach, develop fevers, or are not improving in 5 days, please contact your primary care provider for an appointment or visit any of our convenient Walk-in or Urgent Care Centers to be seen, which can be found on our website here.    Thanks again for choosing us as your health care partner,    Caleb Street MD  Urinary Tract Infection (UTI) in Women: Care Instructions  Overview     A urinary tract infection, or UTI, is a general term for an infection anywhere between the kidneys and the urethra (where urine comes out). Most UTIs are bladder infections. They often cause pain or burning when you urinate.  UTIs are caused by bacteria and can be cured with antibiotics. Be sure to complete your treatment so that the infection does not get worse.  Follow-up care is a key part of your treatment and safety. Be sure to make and go to all appointments, and call your doctor if you are having problems. It's also a good idea to know your test results and keep a list of the medicines you take.  How can you care for yourself at home?    Take your antibiotics as directed. Do not stop taking them just because you feel better. You need to take the full course of antibiotics.    Drink extra water  "and other fluids for the next day or two. This will help make the urine less concentrated and help wash out the bacteria that are causing the infection. (If you have kidney, heart, or liver disease and have to limit fluids, talk with your doctor before you increase the amount of fluids you drink.)    Avoid drinks that are carbonated or have caffeine. They can irritate the bladder.    Urinate often. Try to empty your bladder each time.    To relieve pain, take a hot bath or lay a heating pad set on low over your lower belly or genital area. Never go to sleep with a heating pad in place.  To prevent UTIs    Drink plenty of water each day. This helps you urinate often, which clears bacteria from your system. (If you have kidney, heart, or liver disease and have to limit fluids, talk with your doctor before you increase the amount of fluids you drink.)    Urinate when you need to.    If you are sexually active, urinate right after you have sex.    Change sanitary pads often.    Avoid douches, bubble baths, feminine hygiene sprays, and other feminine hygiene products that have deodorants.    After going to the bathroom, wipe from front to back.  When should you call for help?   Call your doctor now or seek immediate medical care if:      Symptoms such as fever, chills, nausea, or vomiting get worse or appear for the first time.       You have new pain in your back just below your rib cage. This is called flank pain.       There is new blood or pus in your urine.       You have any problems with your antibiotic medicine.   Watch closely for changes in your health, and be sure to contact your doctor if:      You are not getting better after taking an antibiotic for 2 days.       Your symptoms go away but then come back.   Where can you learn more?  Go to https://www.healthwise.net/patiented  Enter K848 in the search box to learn more about \"Urinary Tract Infection (UTI) in Women: Care Instructions.\"  Current as of: " February 28, 2023               Content Version: 13.8    [id number] [employer].   Care instructions adapted under license by your healthcare professional. If you have questions about a medical condition or this instruction, always ask your healthcare professional. [employer] disclaims any warranty or liability for your use of this information.

## 2023-12-19 ENCOUNTER — TRANSFERRED RECORDS (OUTPATIENT)
Dept: HEALTH INFORMATION MANAGEMENT | Facility: CLINIC | Age: 73
End: 2023-12-19
Payer: COMMERCIAL

## 2024-01-02 ENCOUNTER — OFFICE VISIT (OUTPATIENT)
Dept: FAMILY MEDICINE | Facility: CLINIC | Age: 74
End: 2024-01-02
Payer: COMMERCIAL

## 2024-01-02 VITALS
OXYGEN SATURATION: 97 % | WEIGHT: 155 LBS | HEART RATE: 63 BPM | DIASTOLIC BLOOD PRESSURE: 76 MMHG | TEMPERATURE: 97.7 F | RESPIRATION RATE: 12 BRPM | HEIGHT: 64 IN | BODY MASS INDEX: 26.46 KG/M2 | SYSTOLIC BLOOD PRESSURE: 117 MMHG

## 2024-01-02 DIAGNOSIS — Z01.818 PREOP GENERAL PHYSICAL EXAM: Primary | ICD-10-CM

## 2024-01-02 DIAGNOSIS — Z96.642 H/O TOTAL HIP ARTHROPLASTY, LEFT: ICD-10-CM

## 2024-01-02 DIAGNOSIS — B35.1 TOENAIL FUNGUS: ICD-10-CM

## 2024-01-02 DIAGNOSIS — M16.11 OSTEOARTHRITIS OF RIGHT HIP, UNSPECIFIED OSTEOARTHRITIS TYPE: ICD-10-CM

## 2024-01-02 DIAGNOSIS — M25.551 HIP PAIN, RIGHT: ICD-10-CM

## 2024-01-02 LAB
CREAT SERPL-MCNC: 0.74 MG/DL (ref 0.51–0.95)
EGFRCR SERPLBLD CKD-EPI 2021: 86 ML/MIN/1.73M2
ERYTHROCYTE [DISTWIDTH] IN BLOOD BY AUTOMATED COUNT: 11.7 % (ref 10–15)
HCT VFR BLD AUTO: 43.2 % (ref 35–47)
HGB BLD-MCNC: 14 G/DL (ref 11.7–15.7)
MCH RBC QN AUTO: 29.7 PG (ref 26.5–33)
MCHC RBC AUTO-ENTMCNC: 32.4 G/DL (ref 31.5–36.5)
MCV RBC AUTO: 92 FL (ref 78–100)
PLATELET # BLD AUTO: 286 10E3/UL (ref 150–450)
POTASSIUM SERPL-SCNC: 4.1 MMOL/L (ref 3.4–5.3)
RBC # BLD AUTO: 4.71 10E6/UL (ref 3.8–5.2)
SODIUM SERPL-SCNC: 142 MMOL/L (ref 135–145)
WBC # BLD AUTO: 5.9 10E3/UL (ref 4–11)

## 2024-01-02 PROCEDURE — 85027 COMPLETE CBC AUTOMATED: CPT | Performed by: NURSE PRACTITIONER

## 2024-01-02 PROCEDURE — 99214 OFFICE O/P EST MOD 30 MIN: CPT | Performed by: NURSE PRACTITIONER

## 2024-01-02 PROCEDURE — 82565 ASSAY OF CREATININE: CPT | Performed by: NURSE PRACTITIONER

## 2024-01-02 PROCEDURE — 36415 COLL VENOUS BLD VENIPUNCTURE: CPT | Performed by: NURSE PRACTITIONER

## 2024-01-02 PROCEDURE — 84295 ASSAY OF SERUM SODIUM: CPT | Performed by: NURSE PRACTITIONER

## 2024-01-02 PROCEDURE — 84132 ASSAY OF SERUM POTASSIUM: CPT | Performed by: NURSE PRACTITIONER

## 2024-01-02 RX ORDER — CICLOPIROX 80 MG/ML
SOLUTION TOPICAL
Qty: 6.6 ML | Refills: 3 | Status: SHIPPED | OUTPATIENT
Start: 2024-01-02

## 2024-01-02 NOTE — PATIENT INSTRUCTIONS
HOLD AM medications the day of Procedure.     -Do not take aspirin starting 7 days prior to your surgery unless specifically told otherwise.     -Do not take NSAIDs (ex: ibuprofen/Advil, naproxen/Aleve, Motrin) starting 7 days prior to your surgery.     -Do not take omega 3 fatty acid supplements (like fish oil) and any oral vitamin E supplement starting 7 days prior to your surgery.    Stop all vitamins unless specifically told otherwise.      -OK to use acetaminophen (Tylenol) for pain control until your surgery.     The surgery team or pre-operative nurse will give you detailed information about restrictions on eating and drinking before surgery and if you need to complete a special bathing procedure prior to surgery. Typically, it is nothing to eat or drink at least 8-10 hours prior to help prevent aspiration.

## 2024-01-02 NOTE — PROGRESS NOTES
[facility]  [address] [address]  Phone: [phone]  Fax: [phone]  Primary Provider: No Ref-Primary, Physician  Pre-op Performing Provider: BARBRA VILLEGAS      PREOPERATIVE EVALUATION:  Today's date: 2024    Neela is a 73 year old, presenting for the followin/2/2024     8:58 AM   Additional Questions   Roomed by Tomas       Surgical Information:  Surgery/Procedure: Right Hip Replacement  Surgery Location: [facility]  Surgeon: Dr. Winchester  Surgery Date: 2024  Time of Surgery: Pending  Where patient plans to recover: At home with family  Fax number for surgical facility: [phone]    Assessment & Plan     The proposed surgical procedure is considered INTERMEDIATE risk.    Osteoarthritis of right hip, unspecified osteoarthritis type      H/O total hip arthroplasty, left      Hip pain, right      Preop general physical exam    - Sodium  - Potassium  - Creatinine  - CBC with platelets  - Sodium  - Potassium  - Creatinine  - CBC with platelets    Toenail fungus  - stable, requesting refills.   - ciclopirox (PENLAC) 8 % external solution  Dispense: 6.6 mL; Refill: 3        - No identified additional risk factors other than previously addressed    Antiplatelet or Anticoagulation Medication Instructions:   - Patient is on no antiplatelet or anticoagulation medications.    Additional Medication Instructions:  HOLD AM medications the day of Procedure.     -Do not take aspirin starting 7 days prior to your surgery unless specifically told otherwise.     -Do not take NSAIDs (ex: ibuprofen/Advil, naproxen/Aleve, Motrin) starting 7 days prior to your surgery.     -Do not take omega 3 fatty acid supplements (like fish oil) and any oral vitamin E supplement starting 7 days prior to your surgery.    Stop all vitamins unless specifically told otherwise.      -OK to use acetaminophen (Tylenol) for pain control until your  surgery.  RECOMMENDATION:  APPROVAL GIVEN to proceed with proposed procedure, without further diagnostic evaluation.    Subjective     HPI related to upcoming procedure: right hip pain, osteoarthritis        1/2/2024     8:18 AM   Preop Questions   1. Have you ever had a heart attack or stroke? No   2. Have you ever had surgery on your heart or blood vessels, such as a stent placement, a coronary artery bypass, or surgery on an artery in your head, neck, heart, or legs? No   3. Do you have chest pain with activity? No   4. Do you have a history of  heart failure? No   5. Do you currently have a cold, bronchitis or symptoms of other infection? No   6. Do you have a cough, shortness of breath, or wheezing? No   7. Do you or anyone in your family have previous history of blood clots? No   8. Do you or does anyone in your family have a serious bleeding problem such as prolonged bleeding following surgeries or cuts? No   9. Have you ever had problems with anemia or been told to take iron pills? No   10. Have you had any abnormal blood loss such as black, tarry or bloody stools, or abnormal vaginal bleeding? No   11. Have you ever had a blood transfusion? No   12. Are you willing to have a blood transfusion if it is medically needed before, during, or after your surgery? Yes   13. Have you or any of your relatives ever had problems with anesthesia? No   14. Do you have sleep apnea, excessive snoring or daytime drowsiness? No   15. Do you have any artifical heart valves or other implanted medical devices like a pacemaker, defibrillator, or continuous glucose monitor? No   16. Do you have artificial joints? YES - left hip   17. Are you allergic to latex? No         Status of Chronic Conditions:  See problem list for active medical problems.  Problems all longstanding and stable, except as noted/documented.  See ROS for pertinent symptoms related to these conditions.    Review of Systems  Constitutional, neuro, ENT,  endocrine, pulmonary, cardiac, gastrointestinal, genitourinary, musculoskeletal, integument and psychiatric systems are negative, except as otherwise noted.    Patient Active Problem List    Diagnosis Date Noted    H/O total hip arthroplasty, left 01/02/2024     Priority: Medium    Hyperlipidemia      Priority: Medium     No meds  History to date per PCP, Dr Nichole:   - 1997 nonfasting: T245/H65  - 9/4/09 [facility], fasting: T237/TG60/H55/L170; started fish oil 4g/day  - 2/9/10 fasting:T253/TG57/H54/L187; renewed efforts at lifestyle mod  - 9/22/10: T235/TG62/H60/L163, cont diet/exercise/wt loss  - 10/11/11 fasting: T253/TG55/H65/L177, declines meds, cont   diet/exercise/wt loss  - 11/30/12: T227/TG90/H59/L150, improved, cont lifestyle modifications &   wt loss efforts  - 12/30/13 fasting: T264/TG91/H64/L182; advised lifestyle modifications &   wt loss  - 12/30/14 fasting: T245/TG65/H62/L170; advised lifestyle modifications &   wt loss  - 2/15/16 fasting: T266/TG70/H62/cL190; reviewed 2/22/2016, pt tryin TLC,   recheck lipids 3-6 mo        Menopause Has Occurred      Priority: Medium     Created by Conversion  Replacement Utility updated for latest IMO load        Overweight      Priority: Medium     - 2/12/09: BMI 26.6  - 3/17/14: BMI 27.6  - 1/15/15: BMI 28.4 (165#)  - 2/22/2016: BMI 28.2 (167#)        Patellar Chondromalacia      Priority: Medium     - 5/4/10 MRI: patellar chondromalacia w/ grade 4 changes & full thickness   defect  - 7/8/10 Dr Nikolas Leyva's PA: referred for PT then prn cortisone   vs synvisc injections  - 11/16/15 [facility]: possible R knee meniscal tear; MRI   ordered; f/u after MRI  - 11/23/15 [facility]: MRI R knee showed severe chondromalacia, no   meniscal tear; cortisone injection done R knee; referred for PT; if still   symptoms in 6 wks consider viscosupplementation injections        H/O cold sores      Priority: Medium     Oral Herpes. Takes Valtrex prn  -  1/15/2015: 1 outbreak in past year, renewed prescription//sds        Hyperactivity Of The Bladder      Priority: Medium     - uses prn oxybutinin (originally rxd by Dr Nomi Macario, [facility]; PCP/Dionisio rxing as of 1/15/2015)  - sometimes leaking with valsalva, sometimes urgency        Benign Adenoma Of The Large Intestine      Priority: Medium     - 4/1/14 DrAwais, [facility], repeat screening colonoscopy: 2mm polyp ileocecal   valve (path=hyperplastic polyp); 4mm polyp splenic flexure (path=sessile   serrated adenoma, no overt dysplasia); 4mm polyp sigmoid colon (not   removed due to pt discomfort); repeat 5 yrs using MAC & propofol        Localized Primary Osteoarthritis Of The Left Hip 06/27/2011     Priority: Medium     - 6/27/11 [facility]: mild degenerative changes per xray left   hip, advised stretching, prn analgesics, if worsening consider MRI          Past Medical History:   Diagnosis Date    Chickenpox 12 y/o    Dermatophytosis of nail 2008, 2009 2008. Recurrence 1/2009. Painful.  Treating with oral lamisil x3 months then recheck. Checked LFTs & Cr (wnl)    Esophageal reflux 8/15/2011    - 8/15/11: c/o chest discomfort, bloating sensation, +epigastric tender, rxd omeprazole 20mg XR/day - 10/2011: took PPI x1 mo, then stopped and symptoms have not returned    Fibrocystic breast     H/O exercise stress test 8/24/11    - 8/24/11 exercise Stress Test, [facility]: neg test, no EKG changes, no inducible ischemia, good exercise tolerance (105% of predicted)     Past Surgical History:   Procedure Laterality Date    HC REMOVE TONSILS/ADENOIDS,<11 Y/O  1958 (7 y/o)    T&A    OTHER SURGICAL HISTORY Right 10/26/2005    [facility] REVISE MEDIAN N/CARPAL TUNNEL SURG- 10/26/05 Dr Duarte, [facility]: RIGHT carpal tunnel release    OTHER SURGICAL HISTORY Left 06/01/2007    [facility] REVISE MEDIAN N/CARPAL TUNNEL SURG- 6/2007 [facility]: L carpal tunnel release     OTHER SURGICAL HISTORY  07/09/2007    MS SLING OPER STRES INCONTINENCE- 7/9/07 Dr Chin: TVT for stress incontinence    MS EXCISION OF GANGLION CYST FOREARM Right     wrist    TOTAL HIP ARTHROPLASTY Left     ZZC TOTAL ABDOM HYSTERECTOMY  05/01/1999    - 5/1999: ANNALEE for fibroids, benign, by Dr. Mitchell ([facility]). Cervix taken. Does NOT need paps (per Dionisio 2/2010)     Current Outpatient Medications   Medication Sig Dispense Refill    acetaminophen (TYLENOL) 325 MG tablet [ACETAMINOPHEN (TYLENOL) 325 MG TABLET] Take 650 mg by mouth every 6 (six) hours as needed for pain. Takes 2 tablets every 8 hours if needed for pain      ciclopirox (PENLAC) 8 % external solution Apply to adjacent skin and affected nails daily.  Remove with alcohol every 7 days, then repeat. 6.6 mL 3    ibuprofen (ADVIL,MOTRIN) 200 MG tablet [IBUPROFEN (ADVIL,MOTRIN) 200 MG TABLET] Take 400 mg by mouth as needed for pain.      oxyBUTYnin (DITROPAN) 5 MG tablet Take 1 tablet (5 mg) by mouth daily 90 tablet 3    valACYclovir (VALTREX) 500 MG tablet TAKE 2 TABS TWICE DAILY X 2 DAYS WITH OUTBREAK, THEN 1 TAB TWICE DAILY FOR UP TO 6 MORE DAYS 20 tablet 0       Allergies   Allergen Reactions    Erythromycin Base [Erythromycin] Hives        Social History     Tobacco Use    Smoking status: Never     Passive exposure: Never    Smokeless tobacco: Never   Substance Use Topics    Alcohol use: Yes     Alcohol/week: 1.0 standard drink of alcohol     Comment: Alcoholic Drinks/day: social      Family History   Problem Relation Age of Onset    Anxiety Disorder Daughter         anxiety on both sides of family; anxiety attacks; daughter had been on Effexor    Breast Cancer Cousin         40s; maternal cousin    Coronary Artery Disease Maternal Grandfather     Coronary Artery Disease Maternal Aunt 75.00        Mat aunt: MI 76 y/o    Coronary Artery Disease Maternal Uncle         several mat uncles    Nephrolithiasis Mother     Filipe Parkinson White  "syndrome Mother         controlled on atenolol    Nephrolithiasis Brother     Obesity Brother         brothers obese    Filipe Parkinson White syndrome Brother         - Brother: attempted ablation (unsuccessful), controlled on atenolol    Breast Cancer Cousin         40s; maternal (bone mets)    Dementia Father     Heart Disease Father      History   Drug Use No         Objective     /76   Pulse 63   Temp 97.7  F (36.5  C) (Oral)   Resp 12   Ht 1.626 m (5' 4\")   Wt 70.3 kg (155 lb)   SpO2 97%   BMI 26.61 kg/m      Physical Exam    GENERAL APPEARANCE: healthy, alert and no distress     EYES: EOMI, PERRL     NECK: no adenopathy, no asymmetry, masses, or scars and thyroid normal to palpation     RESP: lungs clear to auscultation - no rales, rhonchi or wheezes     CV: regular rates and rhythm, normal S1 S2, no S3 or S4 and no murmur, click or rub     ABDOMEN:  soft, nontender, no HSM or masses and bowel sounds normal     MS: extremities normal- no gross deformities noted, no evidence of inflammation in joints, FROM in all extremities.     SKIN: no suspicious lesions or rashes     NEURO: Normal strength and tone, sensory exam grossly normal, mentation intact and speech normal     PSYCH: mentation appears normal. and affect normal/bright     LYMPHATICS: No cervical adenopathy    Recent Labs   Lab Test 09/05/23  1935 06/02/23  0936 12/08/22  0955 11/30/22  1008   HGB  --  14.5 14.1 14.1   PLT  --   --  264  --    NA  --  141  --  140   POTASSIUM  --  3.9  --  3.8   CR 0.9 0.82  --  0.87      Lab Results   Component Value Date    WBC 5.9 01/02/2024     Lab Results   Component Value Date    RBC 4.71 01/02/2024     Lab Results   Component Value Date    HGB 14.0 01/02/2024     Lab Results   Component Value Date    HCT 43.2 01/02/2024     No components found for: \"MCT\"  Lab Results   Component Value Date    MCV 92 01/02/2024     Lab Results   Component Value Date    MCH 29.7 01/02/2024     Lab Results   Component " Value Date    MCHC 32.4 01/02/2024     Lab Results   Component Value Date    RDW 11.7 01/02/2024     Lab Results   Component Value Date     01/02/2024       Diagnostics:  Labs pending at this time.  Results will be reviewed when available.   No EKG required, no history of coronary heart disease, significant arrhythmia, peripheral arterial disease or other structural heart disease.    Revised Cardiac Risk Index (RCRI):  The patient has the following serious cardiovascular risks for perioperative complications:   - No serious cardiac risks = 0 points          Signed Electronically by: MATT Mistry CNP  Copy of this evaluation report is provided to requesting physician.

## 2024-01-03 NOTE — RESULT ENCOUNTER NOTE
Angel Keita,    Your labs all look stable and within normal ranges. Great news!    If you have any other questions, or concerns please let me know.     Thank you for allowing me to be apart of your medical cares!    Take Care,    MATT Mistry CNP

## 2024-01-04 DIAGNOSIS — Z86.19 H/O COLD SORES: ICD-10-CM

## 2024-01-05 RX ORDER — VALACYCLOVIR HYDROCHLORIDE 500 MG/1
TABLET, FILM COATED ORAL
Qty: 20 TABLET | Refills: 0 | Status: SHIPPED | OUTPATIENT
Start: 2024-01-05

## 2024-01-09 ENCOUNTER — TRANSFERRED RECORDS (OUTPATIENT)
Dept: HEALTH INFORMATION MANAGEMENT | Facility: CLINIC | Age: 74
End: 2024-01-09
Payer: COMMERCIAL

## 2024-01-24 ENCOUNTER — TRANSFERRED RECORDS (OUTPATIENT)
Dept: HEALTH INFORMATION MANAGEMENT | Facility: CLINIC | Age: 74
End: 2024-01-24
Payer: COMMERCIAL

## 2024-02-02 ENCOUNTER — LAB (OUTPATIENT)
Dept: LAB | Facility: CLINIC | Age: 74
End: 2024-02-02
Payer: COMMERCIAL

## 2024-02-02 ENCOUNTER — TRANSFERRED RECORDS (OUTPATIENT)
Dept: HEALTH INFORMATION MANAGEMENT | Facility: CLINIC | Age: 74
End: 2024-02-02

## 2024-02-02 DIAGNOSIS — Z48.89 ENCOUNTER FOR POSTOPERATIVE WOUND CARE: Primary | ICD-10-CM

## 2024-02-02 DIAGNOSIS — T81.41XA SUPERFICIAL INCISIONAL INFECTION OF SURGICAL SITE: ICD-10-CM

## 2024-02-02 DIAGNOSIS — Z48.89 ENCOUNTER FOR POSTOPERATIVE WOUND CARE: ICD-10-CM

## 2024-02-02 LAB
CRP SERPL-MCNC: 4.92 MG/L
ERYTHROCYTE [SEDIMENTATION RATE] IN BLOOD BY WESTERGREN METHOD: 23 MM/HR (ref 0–30)

## 2024-02-02 PROCEDURE — 86140 C-REACTIVE PROTEIN: CPT

## 2024-02-02 PROCEDURE — 36415 COLL VENOUS BLD VENIPUNCTURE: CPT

## 2024-02-02 PROCEDURE — 85652 RBC SED RATE AUTOMATED: CPT

## 2024-02-06 ENCOUNTER — TRANSFERRED RECORDS (OUTPATIENT)
Dept: HEALTH INFORMATION MANAGEMENT | Facility: CLINIC | Age: 74
End: 2024-02-06
Payer: COMMERCIAL

## 2024-03-05 ENCOUNTER — TRANSFERRED RECORDS (OUTPATIENT)
Dept: HEALTH INFORMATION MANAGEMENT | Facility: CLINIC | Age: 74
End: 2024-03-05
Payer: COMMERCIAL

## 2024-06-03 ENCOUNTER — ANCILLARY PROCEDURE (OUTPATIENT)
Dept: MAMMOGRAPHY | Facility: CLINIC | Age: 74
End: 2024-06-03
Attending: NURSE PRACTITIONER
Payer: COMMERCIAL

## 2024-06-03 DIAGNOSIS — Z12.31 VISIT FOR SCREENING MAMMOGRAM: ICD-10-CM

## 2024-06-03 PROCEDURE — 77063 BREAST TOMOSYNTHESIS BI: CPT

## 2024-06-09 SDOH — HEALTH STABILITY: PHYSICAL HEALTH: ON AVERAGE, HOW MANY DAYS PER WEEK DO YOU ENGAGE IN MODERATE TO STRENUOUS EXERCISE (LIKE A BRISK WALK)?: 4 DAYS

## 2024-06-09 SDOH — HEALTH STABILITY: PHYSICAL HEALTH: ON AVERAGE, HOW MANY MINUTES DO YOU ENGAGE IN EXERCISE AT THIS LEVEL?: 50 MIN

## 2024-06-09 ASSESSMENT — SOCIAL DETERMINANTS OF HEALTH (SDOH): HOW OFTEN DO YOU GET TOGETHER WITH FRIENDS OR RELATIVES?: MORE THAN THREE TIMES A WEEK

## 2024-06-14 ENCOUNTER — OFFICE VISIT (OUTPATIENT)
Dept: FAMILY MEDICINE | Facility: CLINIC | Age: 74
End: 2024-06-14
Attending: FAMILY MEDICINE
Payer: COMMERCIAL

## 2024-06-14 VITALS
DIASTOLIC BLOOD PRESSURE: 72 MMHG | SYSTOLIC BLOOD PRESSURE: 118 MMHG | HEIGHT: 64 IN | OXYGEN SATURATION: 97 % | HEART RATE: 63 BPM | WEIGHT: 159.3 LBS | BODY MASS INDEX: 27.2 KG/M2

## 2024-06-14 DIAGNOSIS — E78.2 MIXED HYPERLIPIDEMIA: Chronic | ICD-10-CM

## 2024-06-14 DIAGNOSIS — Z96.642 H/O TOTAL HIP ARTHROPLASTY, LEFT: ICD-10-CM

## 2024-06-14 DIAGNOSIS — B35.1 TOENAIL FUNGUS: ICD-10-CM

## 2024-06-14 DIAGNOSIS — Z00.00 ENCOUNTER FOR MEDICARE ANNUAL WELLNESS EXAM: Primary | ICD-10-CM

## 2024-06-14 PROCEDURE — 99214 OFFICE O/P EST MOD 30 MIN: CPT | Mod: 25 | Performed by: NURSE PRACTITIONER

## 2024-06-14 PROCEDURE — G0439 PPPS, SUBSEQ VISIT: HCPCS | Performed by: NURSE PRACTITIONER

## 2024-06-14 RX ORDER — ROSUVASTATIN CALCIUM 5 MG/1
5 TABLET, COATED ORAL DAILY
Qty: 90 TABLET | Refills: 3 | Status: SHIPPED | OUTPATIENT
Start: 2024-06-14

## 2024-06-14 NOTE — PROGRESS NOTES
"Preventive Care Visit  [facility] MATT Dickson CNP, Family Medicine  Jun 14, 2024      Assessment & Plan     Encounter for Medicare annual wellness exam  **  - PRIMARY CARE FOLLOW-UP SCHEDULING  - Glucose    Mixed hyperlipidemia  **  - Lipid Profile (Chol, Trig, HDL, LDL calc)  - rosuvastatin (CRESTOR) 5 MG tablet  Dispense: 90 tablet; Refill: 3  - AST  - ALT    H/O total hip arthroplasty, left  **    Toenail fungus  **    - glad she had surgery for her hip.   - lipid/LDL have been elevated and she is concerned. Has family HO CAD, and previous CT did note Coronary atherosclerosis. With ascvd score also elevated, she will start Crestor and follow up with labs in 2-4 months fasting. Low saturated fat diet recommended.   - VSS. Praised her for pilates work-out.   - declined oral medication for toenail fungus. Will continue nail lacquer and see Derm     Patient has been advised of split billing requirements and indicates understanding: Yes        BMI  Estimated body mass index is 27.34 kg/m  as calculated from the following:    Height as of this encounter: 1.626 m (5' 4\").    Weight as of this encounter: 72.3 kg (159 lb 4.8 oz).       Counseling  Appropriate preventive services were discussed with this patient, including applicable screening as appropriate for fall prevention, nutrition, physical activity, Tobacco-use cessation, weight loss and cognition.  Checklist reviewing preventive services available has been given to the patient.  Reviewed patient's diet, addressing concerns and/or questions.   She is at risk for psychosocial distress and has been provided with information to reduce risk.   The patient was provided with written information regarding signs of hearing loss.       Chyna Keita is a 74 year old, presenting for the following:  Wellness Visit        6/14/2024     9:07 AM   Additional Questions   Roomed by Vanna MALDONADO  Enjoys traveling and pilates. Does " pilates at least three days a week. Dexa done 2022. Her hips are feeling good post surgery. Still has big toenail fungus. Nail lacquer not helping.         6/9/2024   General Health   How would you rate your overall physical health? Good   Feel stress (tense, anxious, or unable to sleep) Only a little   (!) STRESS CONCERN      6/9/2024   Nutrition   Diet: Regular (no restrictions)         6/9/2024   Exercise   Days per week of moderate/strenous exercise 4 days   Average minutes spent exercising at this level 50 min         6/9/2024   Social Factors   Frequency of gathering with friends or relatives More than three times a week   Worry food won't last until get money to buy more No   Food not last or not have enough money for food? No   Do you have housing?  Yes   Are you worried about losing your housing? No   Lack of transportation? No   Unable to get utilities (heat,electricity)? No         6/14/2024   Fall Risk   Fallen 2 or more times in the past year? No   Trouble with walking or balance? No          6/9/2024   Activities of Daily Living- Home Safety   Needs help with the following daily activites None of the above   Safety concerns in the home None of the above         6/9/2024   Dental   Dentist two times every year? Yes         6/9/2024   Hearing Screening   Hearing concerns? (!) IT'S HARD TO FOLLOW A CONVERSATION IN A NOISY RESTAURANT OR CROWDED ROOM.    (!) TROUBLE UNDERSTANDING SOFT OR WHISPERED SPEECH.         6/9/2024   Driving Risk Screening   Patient/family members have concerns about driving No         6/9/2024   General Alertness/Fatigue Screening   Have you been more tired than usual lately? No         6/9/2024   Urinary Incontinence Screening   Bothered by leaking urine in past 6 months No         6/9/2024   TB Screening   Were you born outside of the [place]? No         Today's PHQ-2 Score:       6/13/2024    11:16 PM   PHQ-2 ( 1999 Pf[employer])   Q1: Little interest or pleasure in doing things 0   Q2:  Feeling down, depressed or hopeless 0   PHQ-2 Score 0   Q1: Little interest or pleasure in doing things Not at all   Q2: Feeling down, depressed or hopeless Not at all   PHQ-2 Score 0           2024   Substance Use   Alcohol more than 3/day or more than 7/wk No   Do you have a current opioid prescription? No   How severe/bad is pain from 1 to 10? 0/10 (No Pain)   Do you use any other substances recreationally? (!) CANNABIS PRODUCTS     Social History     Tobacco Use    Smoking status: Never     Passive exposure: Never    Smokeless tobacco: Never   Vaping Use    Vaping status: Never Used   Substance Use Topics    Alcohol use: Yes     Alcohol/week: 1.0 standard drink of alcohol     Comment: Alcoholic Drinks/day: social     Drug use: No           6/3/2024   LAST FHS-7 RESULTS   1st degree relative breast or ovarian cancer No   Any relative bilateral breast cancer No   Any male have breast cancer No   Any ONE woman have BOTH breast AND ovarian cancer No   Any woman with breast cancer before 50yrs No   2 or more relatives with breast AND/OR ovarian cancer No   2 or more relatives with breast AND/OR bowel cancer No        Mammogram Screening - Mammogram every 1-2 years updated in Health Maintenance based on mutual decision making    ASCVD Risk   The 10-year ASCVD risk score (Oral EDMONDSON, et al., 2019) is: 10.2%    Values used to calculate the score:      Age: 74 years      Sex: Female      Is Non- : No      Diabetic: No      Tobacco smoker: No      Systolic Blood Pressure: 118 mmHg      Is BP treated: No      HDL Cholesterol: 67 mg/dL      Total Cholesterol: 233 mg/dL    Fracture Risk Assessment Tool  Link to Frax Calculator  Use the information below to complete the Frax calculator  : 1950  Sex: female  Weight (kg): 72.3 kg (actual weight)  Height (cm): 162.6 cm  Previous Fragility Fracture:  No  History of parent with fractured hip:  No  Current Smoking:  No  Patient has been  on glucocorticoids for more than 3 months (5mg/day or more): No  Rheumatoid Arthritis on Problem List:  No  Secondary Osteoporosis on Problem List:  No  Consumes 3 or more units of alcohol per day: No  Femoral Neck BMD (g/cm2)            Reviewed and updated as needed this visit by Provider                    Past Medical History:   Diagnosis Date    Arthritis     Chickenpox 12 y/o    Dermatophytosis of nail 2008, 2009 2008. Recurrence 1/2009. Painful.  Treating with oral lamisil x3 months then recheck. Checked LFTs & Cr (wnl)    Esophageal reflux 08/15/2011    - 8/15/11: c/o chest discomfort, bloating sensation, +epigastric tender, rxd omeprazole 20mg XR/day - 10/2011: took PPI x1 mo, then stopped and symptoms have not returned    Fibrocystic breast     H/O exercise stress test 08/24/2011    - 8/24/11 exercise Stress Test, [facility]: neg test, no EKG changes, no inducible ischemia, good exercise tolerance (105% of predicted)     Past Surgical History:   Procedure Laterality Date    COLONOSCOPY  04/13/2022    HC REMOVE TONSILS/ADENOIDS,<11 Y/O  1958 (7 y/o)    T&A    ORTHOPEDIC SURGERY  12- & 01-    Left & right hip replacement    OTHER SURGICAL HISTORY Right 10/26/2005    SC REVISE MEDIAN N/CARPAL TUNNEL SURG- 10/26/05 Dr uDarte, [facility]: RIGHT carpal tunnel release    OTHER SURGICAL HISTORY Left 06/01/2007    SC REVISE MEDIAN N/CARPAL TUNNEL SURG- 6/2007 Dr Sullivan, [facility]: L carpal tunnel release    OTHER SURGICAL HISTORY  07/09/2007    SC SLING OPER STRES INCONTINENCE- 7/9/07 Dr Chin: TVT for stress incontinence    SC EXCISION OF GANGLION CYST FOREARM Right     wrist    SOFT TISSUE SURGERY  2007 and 2022    Carpal tunnel on both hands  and ganglion cyst on right hand    TOTAL HIP ARTHROPLASTY Left     [facility] TOTAL ABDOM HYSTERECTOMY  05/01/1999    - 5/1999: ANNALEE for fibroids, benign, by Dr. Mitchell ([facility]). Cervix taken. Does NOT need paps  "(per Nichole 2/2010)     Labs reviewed in EPIC  Current providers sharing in care for this patient include:  Patient Care Team:  Myriam Johnson APRN CNP as PCP - General (Family Medicine)  Myriam Johnson APRN CNP as Assigned PCP    The following health maintenance items are reviewed in Epic and correct as of today:  Health Maintenance   Topic Date Due    RSV VACCINE (Pregnancy & 60+) (1 - 1-dose 60+ series) Never done    COVID-19 Vaccine (7 - 2023-24 season) 02/13/2024    ANNUAL REVIEW OF HM ORDERS  06/02/2024    MEDICARE ANNUAL WELLNESS VISIT  06/02/2024    LIPID  09/01/2024    FALL RISK ASSESSMENT  06/14/2025    GLUCOSE  06/02/2026    MAMMO SCREENING  06/03/2026    COLORECTAL CANCER SCREENING  04/13/2027    DEXA  11/07/2027    ADVANCE CARE PLANNING  06/02/2028    DTAP/TDAP/TD IMMUNIZATION (3 - Td or Tdap) 03/22/2029    HEPATITIS C SCREENING  Completed    PHQ-2 (once per calendar year)  Completed    INFLUENZA VACCINE  Completed    Pneumococcal Vaccine: 65+ Years  Completed    ZOSTER IMMUNIZATION  Completed    IPV IMMUNIZATION  Aged Out    HPV IMMUNIZATION  Aged Out    MENINGITIS IMMUNIZATION  Aged Out    RSV MONOCLONAL ANTIBODY  Aged Out         Review of Systems  Constitutional, HEENT, cardiovascular, pulmonary, gi and gu systems are negative, except as otherwise noted.     Objective    Exam  /72   Pulse 63   Ht 1.626 m (5' 4\")   Wt 72.3 kg (159 lb 4.8 oz)   SpO2 97%   BMI 27.34 kg/m     Estimated body mass index is 27.34 kg/m  as calculated from the following:    Height as of this encounter: 1.626 m (5' 4\").    Weight as of this encounter: 72.3 kg (159 lb 4.8 oz).    Physical Exam  GENERAL: alert and no distress  EYES: Eyes grossly normal to inspection, PERRL and conjunctivae and sclerae normal  NECK: no adenopathy, no asymmetry, masses, or scars  RESP: lungs clear to auscultation - no rales, rhonchi or wheezes  CV: regular rate and rhythm, normal S1 S2, no S3 or S4, no murmur, click or rub, no " peripheral edema  ABDOMEN: soft, nontender, no hepatosplenomegaly, no masses and bowel sounds normal  MS: no gross musculoskeletal defects noted, no edema  SKIN: no suspicious lesions or rashes. Big toe nail bilateral is thickened, yellowish.   NEURO: Normal strength and tone, mentation intact and speech normal  PSYCH: mentation appears normal, affect normal/bright  LYMPH: no cervical, supraclavicular, axillary, or inguinal adenopathy         6/14/2024   Mini Cog   Clock Draw Score 2 Normal   3 Item Recall 3 objects recalled   Mini Cog Total Score 5              Signed Electronically by: MATT Mistry CNP

## 2024-09-09 ENCOUNTER — OFFICE VISIT (OUTPATIENT)
Dept: FAMILY MEDICINE | Facility: CLINIC | Age: 74
End: 2024-09-09
Payer: COMMERCIAL

## 2024-09-09 ENCOUNTER — LAB (OUTPATIENT)
Dept: LAB | Facility: CLINIC | Age: 74
End: 2024-09-09
Payer: COMMERCIAL

## 2024-09-09 VITALS
BODY MASS INDEX: 27.66 KG/M2 | OXYGEN SATURATION: 95 % | HEIGHT: 64 IN | TEMPERATURE: 97.4 F | HEART RATE: 75 BPM | DIASTOLIC BLOOD PRESSURE: 72 MMHG | WEIGHT: 162 LBS | SYSTOLIC BLOOD PRESSURE: 114 MMHG | RESPIRATION RATE: 14 BRPM

## 2024-09-09 DIAGNOSIS — B35.1 TOENAIL FUNGUS: ICD-10-CM

## 2024-09-09 DIAGNOSIS — E78.2 MIXED HYPERLIPIDEMIA: Chronic | ICD-10-CM

## 2024-09-09 DIAGNOSIS — B35.1 ONYCHOMYCOSIS: ICD-10-CM

## 2024-09-09 DIAGNOSIS — B35.1 ONYCHOMYCOSIS: Primary | ICD-10-CM

## 2024-09-09 DIAGNOSIS — E78.2 MIXED HYPERLIPIDEMIA: ICD-10-CM

## 2024-09-09 DIAGNOSIS — Z00.00 ENCOUNTER FOR MEDICARE ANNUAL WELLNESS EXAM: ICD-10-CM

## 2024-09-09 PROBLEM — K63.5 POLYP OF COLON: Status: ACTIVE | Noted: 2019-04-04

## 2024-09-09 PROBLEM — Z86.0100 HISTORY OF COLONIC POLYPS: Status: ACTIVE | Noted: 2019-04-08

## 2024-09-09 LAB
ALBUMIN SERPL BCG-MCNC: 4.2 G/DL (ref 3.5–5.2)
ALP SERPL-CCNC: 67 U/L (ref 40–150)
ALT SERPL W P-5'-P-CCNC: 12 U/L (ref 0–50)
ALT SERPL W P-5'-P-CCNC: 12 U/L (ref 0–50)
AST SERPL W P-5'-P-CCNC: 17 U/L (ref 0–45)
AST SERPL W P-5'-P-CCNC: 17 U/L (ref 0–45)
BILIRUB DIRECT SERPL-MCNC: <0.2 MG/DL (ref 0–0.3)
BILIRUB SERPL-MCNC: 0.5 MG/DL
CHOLEST SERPL-MCNC: 163 MG/DL
FASTING STATUS PATIENT QL REPORTED: ABNORMAL
FASTING STATUS PATIENT QL REPORTED: NORMAL
GLUCOSE SERPL-MCNC: 102 MG/DL (ref 70–99)
HDLC SERPL-MCNC: 61 MG/DL
LDLC SERPL CALC-MCNC: 92 MG/DL
NONHDLC SERPL-MCNC: 102 MG/DL
PROT SERPL-MCNC: 6.9 G/DL (ref 6.4–8.3)
TRIGL SERPL-MCNC: 51 MG/DL

## 2024-09-09 PROCEDURE — 80076 HEPATIC FUNCTION PANEL: CPT

## 2024-09-09 PROCEDURE — 80061 LIPID PANEL: CPT

## 2024-09-09 PROCEDURE — G2211 COMPLEX E/M VISIT ADD ON: HCPCS | Performed by: NURSE PRACTITIONER

## 2024-09-09 PROCEDURE — 82947 ASSAY GLUCOSE BLOOD QUANT: CPT

## 2024-09-09 PROCEDURE — 36415 COLL VENOUS BLD VENIPUNCTURE: CPT

## 2024-09-09 PROCEDURE — 99214 OFFICE O/P EST MOD 30 MIN: CPT | Performed by: NURSE PRACTITIONER

## 2024-09-09 RX ORDER — TERBINAFINE HYDROCHLORIDE 250 MG/1
250 TABLET ORAL DAILY
Qty: 90 TABLET | Refills: 0 | Status: SHIPPED | OUTPATIENT
Start: 2024-09-09 | End: 2024-12-08

## 2024-09-09 NOTE — PATIENT INSTRUCTIONS
LFTs at baseline and periodically during treatment; signs/symptoms of liver injury (eg, nausea, anorexia, fatigue, vomiting, right upper abdominal pain, jaundice, dark urine, pale stools); CBC (if used >6 weeks in immunodeficient patients or if clinical signs or symptoms of secondary infection occur); taste and/or smell disturbances; skin rash, signs/symptoms of hypersensitivity reaction.

## 2024-09-09 NOTE — PROGRESS NOTES
"  Assessment & Plan     Onychomycosis  **  - Hepatic panel (Albumin, ALT, AST, Bili, Alk Phos, TP)  - terbinafine (LAMISIL) 250 MG tablet  Dispense: 90 tablet; Refill: 0    Mixed hyperlipidemia  **    Toenail fungus  **    - side effects to medication discussed. Liver enzymes added for baseline. She will also discuss med with pharmacist.   - lipid are stable. Can stay on statin.   - will keep an eye on cramps in legs. Does not sound too bad. Stay hydrated.   The longitudinal plan of care for the diagnosis(es)/condition(s) as documented were addressed during this visit. Due to the added complexity in care, I will continue to support Neela in the subsequent management and with ongoing continuity of care.        Subjective   Neela is a 74 year old, presenting for the following health issues:  Consult (Cholesterol )      9/9/2024     1:09 PM   Additional Questions   Roomed by Tomas     - got labs done for lipids. She will sometimes notices intermittent cramping in her legs. Not bad. She has been exercising more with pilates and maybe not drinking enough water.   - she still has toenail fungus on multiple toes. Has been using penlac consistently for about three months. It is a little bit better but not much. No kidney or liver dx. Does not drink a lot of etoh.     History of Present Illness       Reason for visit:  Check cholesterol                      Objective    /72   Pulse 75   Temp 97.4  F (36.3  C) (Oral)   Resp 14   Ht 1.626 m (5' 4\")   Wt 73.5 kg (162 lb)   SpO2 95%   BMI 27.81 kg/m    Body mass index is 27.81 kg/m .  Physical Exam   GENERAL: alert and no distress  MS: no gross musculoskeletal defects noted, no edema  SKIN: no suspicious lesions or rashes  SKIN: no suspicious lesions or rashes and toenail/nail thickening noted on both big toes, second toenails, and some on third to fourth. No redness. Nail intact.   PSYCH: mentation appears normal, affect normal/bright    Lab on 02/02/2024 "   Component Date Value Ref Range Status    Erythrocyte Sedimentation Rate 02/02/2024 23  0 - 30 mm/hr Final    CRP Inflammation 02/02/2024 4.92  <5.00 mg/L Final           Signed Electronically by: MATT Mistry CNP

## 2024-09-10 NOTE — RESULT ENCOUNTER NOTE
Angel Keita    Good to see you again!     Your glucose is still up a little bit, but overall stable. Continue with Pilates, you are doing great!    Your liver enzymes are normal.     Your lipids are better! Lets stay on current dose and continue with exercise and a low saturated fat diet.     MATT Mistry CNP

## 2024-09-21 DIAGNOSIS — N31.8 OTHER NEUROMUSCULAR DYSFUNCTION OF BLADDER: ICD-10-CM

## 2024-09-23 RX ORDER — OXYBUTYNIN CHLORIDE 5 MG/1
5 TABLET ORAL DAILY
Qty: 90 TABLET | Refills: 2 | Status: SHIPPED | OUTPATIENT
Start: 2024-09-23

## 2025-06-01 DIAGNOSIS — E78.2 MIXED HYPERLIPIDEMIA: ICD-10-CM

## 2025-06-02 RX ORDER — ROSUVASTATIN CALCIUM 5 MG/1
5 TABLET, COATED ORAL DAILY
Qty: 30 TABLET | Refills: 0 | Status: SHIPPED | OUTPATIENT
Start: 2025-06-02

## 2025-06-05 ENCOUNTER — ANCILLARY PROCEDURE (OUTPATIENT)
Dept: MAMMOGRAPHY | Facility: CLINIC | Age: 75
End: 2025-06-05
Attending: NURSE PRACTITIONER
Payer: COMMERCIAL

## 2025-06-05 DIAGNOSIS — Z12.31 VISIT FOR SCREENING MAMMOGRAM: ICD-10-CM

## 2025-06-05 PROCEDURE — 77063 BREAST TOMOSYNTHESIS BI: CPT

## 2025-06-11 ENCOUNTER — ANCILLARY PROCEDURE (OUTPATIENT)
Dept: MAMMOGRAPHY | Facility: CLINIC | Age: 75
End: 2025-06-11
Attending: NURSE PRACTITIONER
Payer: COMMERCIAL

## 2025-06-11 DIAGNOSIS — R92.8 ABNORMAL MAMMOGRAM: ICD-10-CM

## 2025-06-11 PROCEDURE — 77065 DX MAMMO INCL CAD UNI: CPT | Mod: RT

## 2025-06-14 DIAGNOSIS — N31.8 OTHER NEUROMUSCULAR DYSFUNCTION OF BLADDER: ICD-10-CM

## 2025-06-15 SDOH — HEALTH STABILITY: PHYSICAL HEALTH: ON AVERAGE, HOW MANY DAYS PER WEEK DO YOU ENGAGE IN MODERATE TO STRENUOUS EXERCISE (LIKE A BRISK WALK)?: 4 DAYS

## 2025-06-15 SDOH — HEALTH STABILITY: PHYSICAL HEALTH: ON AVERAGE, HOW MANY MINUTES DO YOU ENGAGE IN EXERCISE AT THIS LEVEL?: 50 MIN

## 2025-06-15 ASSESSMENT — SOCIAL DETERMINANTS OF HEALTH (SDOH): HOW OFTEN DO YOU GET TOGETHER WITH FRIENDS OR RELATIVES?: MORE THAN THREE TIMES A WEEK

## 2025-06-16 ENCOUNTER — OFFICE VISIT (OUTPATIENT)
Dept: FAMILY MEDICINE | Facility: CLINIC | Age: 75
End: 2025-06-16
Attending: NURSE PRACTITIONER
Payer: COMMERCIAL

## 2025-06-16 VITALS
HEART RATE: 72 BPM | RESPIRATION RATE: 16 BRPM | DIASTOLIC BLOOD PRESSURE: 73 MMHG | WEIGHT: 162.3 LBS | HEIGHT: 64 IN | SYSTOLIC BLOOD PRESSURE: 117 MMHG | BODY MASS INDEX: 27.71 KG/M2 | OXYGEN SATURATION: 97 %

## 2025-06-16 DIAGNOSIS — Z96.641 HISTORY OF RIGHT HIP REPLACEMENT: ICD-10-CM

## 2025-06-16 DIAGNOSIS — Z00.00 ENCOUNTER FOR MEDICARE ANNUAL WELLNESS EXAM: Primary | ICD-10-CM

## 2025-06-16 DIAGNOSIS — B35.1 TOENAIL FUNGUS: ICD-10-CM

## 2025-06-16 DIAGNOSIS — R73.09 ELEVATED GLUCOSE: ICD-10-CM

## 2025-06-16 DIAGNOSIS — Z96.642 H/O TOTAL HIP ARTHROPLASTY, LEFT: ICD-10-CM

## 2025-06-16 DIAGNOSIS — E78.2 MIXED HYPERLIPIDEMIA: ICD-10-CM

## 2025-06-16 DIAGNOSIS — N31.8 OTHER NEUROMUSCULAR DYSFUNCTION OF BLADDER: ICD-10-CM

## 2025-06-16 PROCEDURE — 3074F SYST BP LT 130 MM HG: CPT | Performed by: NURSE PRACTITIONER

## 2025-06-16 PROCEDURE — G0439 PPPS, SUBSEQ VISIT: HCPCS | Performed by: NURSE PRACTITIONER

## 2025-06-16 PROCEDURE — 3078F DIAST BP <80 MM HG: CPT | Performed by: NURSE PRACTITIONER

## 2025-06-16 PROCEDURE — 99214 OFFICE O/P EST MOD 30 MIN: CPT | Mod: 25 | Performed by: NURSE PRACTITIONER

## 2025-06-16 RX ORDER — OXYBUTYNIN CHLORIDE 5 MG/1
5 TABLET ORAL DAILY
Qty: 90 TABLET | Refills: 4 | Status: SHIPPED | OUTPATIENT
Start: 2025-06-16

## 2025-06-16 RX ORDER — ROSUVASTATIN CALCIUM 5 MG/1
5 TABLET, COATED ORAL DAILY
Qty: 90 TABLET | Refills: 3 | Status: SHIPPED | OUTPATIENT
Start: 2025-06-16

## 2025-06-16 RX ORDER — OXYBUTYNIN CHLORIDE 5 MG/1
5 TABLET ORAL DAILY
Qty: 90 TABLET | Refills: 2 | Status: SHIPPED | OUTPATIENT
Start: 2025-06-16 | End: 2025-06-16

## 2025-06-16 NOTE — PROGRESS NOTES
Preventive Care Visit  [facility] MATT Dickson CNP, Family Medicine  Jun 16, 2025      Assessment & Plan     Other neuromuscular dysfunction of bladder:  - Bladder function is stable with current oxybutynin treatment.  - Continue oxybutynin once daily. Consider estrogen creams or suppositories as an option for preventing UTIs if needed in the future.    Mixed hyperlipidemia:  - Cholesterol levels have improved since starting Crestor. No significant family history of heart disease or personal history of heart attack or stroke.  - Continue current dose of Crestor. Check cholesterol levels in October 2025. Work on limiting red meat and saturated fats in the diet. Consider Mediterranean diet.    Toenail fungus:  - Improvement noted with current treatment regimen.  - Continue using Penlac as needed. No refill required at this time.    H/O total hip arthroplasty, left:  - Occasional soreness noted, particularly after sitting.  - Continue stretching exercises. No further intervention discussed.    History of right hip replacement:  - Occasional soreness noted, particularly after sitting.  - Continue stretching exercises. No further intervention discussed.    Encounter Diagnoses   Name Primary?    Other neuromuscular dysfunction of bladder     Mixed hyperlipidemia Yes    Toenail fungus     H/O total hip arthroplasty, left     History of right hip replacement     Elevated glucose        Consent was obtained from the patient to use an AI documentation tool in the creation of this note.    Patient has been advised of split billing requirements and indicates understanding: Yes    The longitudinal plan of care for the diagnosis(es)/condition(s) as documented were addressed during this visit. Due to the added complexity in care, I will continue to support Neela in the subsequent management and with ongoing continuity of care.      BMI  Estimated body mass index is 27.86 kg/m  as calculated from  "the following:    Height as of this encounter: 1.626 m (5' 4\").    Weight as of this encounter: 73.6 kg (162 lb 4.8 oz).   Weight management plan: Discussed healthy diet and exercise guidelines    Counseling  Appropriate preventive services were addressed with this patient via screening, questionnaire, or discussion as appropriate for fall prevention, nutrition, physical activity, Tobacco-use cessation, social engagement, weight loss and cognition.  Checklist reviewing preventive services available has been given to the patient.  Reviewed patient's diet, addressing concerns and/or questions.   The patient was provided with written information regarding signs of hearing loss.       Follow-up       Subjective   Neela is a 75 year old, presenting for the following:  RECHECK and Annual Visit        6/16/2025     9:34 AM   Additional Questions   Roomed by IVETTE Rios  History of Present Illness-  Neela Bonner, 75 years old, female    - Toenail fungus discussed in September, improved with use of Penlac lacquer, apple cider vinegar soaks, and over-the-counter treatments.  - Oxybutynin taken once daily for irritable bladder, reported as effective.  - No urinary tract infections for a long time.  - Cholesterol checked in September after being on Crestor, improved results.  - No history of heart attack or stroke.  - No significant family history of heart disease or heart attacks.  - Recent skin concern: chris appeared a week ago, suspected tick bite, no bullseye rash, no pain, itching, or other symptoms, resolved without intervention.  - History of cold sores, one mild flare-up this year.           Advance Care Planning    Discussed advance care planning with patient; informed AVS has link to [employer].        6/15/2025   General Health   How would you rate your overall physical health? Good   Feel stress (tense, anxious, or unable to sleep) Only a little   (!) STRESS CONCERN      6/15/2025   Nutrition "   Diet: Regular (no restrictions)         6/15/2025   Exercise   Days per week of moderate/strenous exercise 4 days   Average minutes spent exercising at this level 50 min         6/15/2025   Social Factors   Frequency of gathering with friends or relatives More than three times a week   Worry food won't last until get money to buy more No   Food not last or not have enough money for food? No   Do you have housing? (Housing is defined as stable permanent housing and does not include staying outside in a car, in a tent, in an abandoned building, in an overnight shelter, or couch-surfing.) Yes   Are you worried about losing your housing? No   Lack of transportation? No   Unable to get utilities (heat,electricity)? No         6/15/2025   Fall Risk   Fallen 2 or more times in the past year? No    No   Trouble with walking or balance? No    No       Multiple values from one day are sorted in reverse-chronological order          6/15/2025   Activities of Daily Living- Home Safety   Needs help with the following daily activites None of the above   Safety concerns in the home None of the above         6/15/2025   Dental   Dentist two times every year? Yes         6/15/2025   Hearing Screening   Hearing concerns? (!) IT'S HARD TO FOLLOW A CONVERSATION IN A NOISY RESTAURANT OR CROWDED ROOM.         6/15/2025   Driving Risk Screening   Patient/family members have concerns about driving No         6/15/2025   General Alertness/Fatigue Screening   Have you been more tired than usual lately? No         6/15/2025   Urinary Incontinence Screening   Bothered by leaking urine in past 6 months No         Today's PHQ-2 Score:       6/15/2025     5:03 PM   PHQ-2 ( 1999 Pfizer)   Q1: Little interest or pleasure in doing things 0   Q2: Feeling down, depressed or hopeless 0   PHQ-2 Score 0    Q1: Little interest or pleasure in doing things Not at all   Q2: Feeling down, depressed or hopeless Not at all   PHQ-2 Score 0        Patient-reported           6/15/2025   Substance Use   Alcohol more than 3/day or more than 7/wk No   Do you have a current opioid prescription? No   How severe/bad is pain from 1 to 10? 2/10   Do you use any other substances recreationally? (!) CANNABIS PRODUCTS     Social History     Tobacco Use    Smoking status: Never     Passive exposure: Never    Smokeless tobacco: Never   Vaping Use    Vaping status: Never Used   Substance Use Topics    Alcohol use: Yes     Alcohol/week: 1.0 standard drink of alcohol     Comment: Alcoholic Drinks/day: social     Drug use: No           2025   LAST FHS-7 RESULTS   1st degree relative breast or ovarian cancer No   Any relative bilateral breast cancer No   Any male have breast cancer No   Any ONE woman have BOTH breast AND ovarian cancer No   Any woman with breast cancer before 50yrs No   2 or more relatives with breast AND/OR ovarian cancer No   2 or more relatives with breast AND/OR bowel cancer No        Mammogram Screening - Mammogram every 1-2 years updated in Health Maintenance based on mutual decision making    ASCVD Risk   The 10-year ASCVD risk score (Oral EDMONDSON, et al., 2019) is: 10.6%    Values used to calculate the score:      Age: 75 years      Sex: Female      Is Non- : No      Diabetic: No      Tobacco smoker: No      Systolic Blood Pressure: 117 mmHg      Is BP treated: No      HDL Cholesterol: 61 mg/dL      Total Cholesterol: 163 mg/dL    Fracture Risk Assessment Tool  Link to Frax Calculator  Use the information below to complete the Frax calculator  : 1950  Sex: female  Weight (kg): 73.6 kg (actual weight)  Height (cm): 162.6 cm  Previous Fragility Fracture:  No  History of parent with fractured hip:  No  Current Smoking:  No  Patient has been on glucocorticoids for more than 3 months (5mg/day or more): No  Rheumatoid Arthritis on Problem List:  No  Secondary Osteoporosis on Problem List:  No  Consumes 3 or more units  of alcohol per day: No  Femoral Neck BMD (g/cm2)            Reviewed and updated as needed this visit by Provider                    Past Medical History:   Diagnosis Date    Arthritis     Chickenpox 12 y/o    Dermatophytosis of nail 2008, 2009 2008. Recurrence 1/2009. Painful.  Treating with oral lamisil x3 months then recheck. Checked LFTs & Cr (wnl)    Esophageal reflux 08/15/2011    - 8/15/11: c/o chest discomfort, bloating sensation, +epigastric tender, rxd omeprazole 20mg XR/day - 10/2011: took PPI x1 mo, then stopped and symptoms have not returned    Fibrocystic breast     H/O exercise stress test 08/24/2011    - 8/24/11 exercise Stress Test, [facility]: neg test, no EKG changes, no inducible ischemia, good exercise tolerance (105% of predicted)     Past Surgical History:   Procedure Laterality Date    COLONOSCOPY  04/13/2022    HC REMOVE TONSILS/ADENOIDS,<13 Y/O  1958 (9 y/o)    T&A    ORTHOPEDIC SURGERY  12- & 01-    Left & right hip replacement    OTHER SURGICAL HISTORY Right 10/26/2005    IN REVISE MEDIAN N/CARPAL TUNNEL SURG- 10/26/05 Dr Duarte, [facility]: RIGHT carpal tunnel release    OTHER SURGICAL HISTORY Left 06/01/2007    IN REVISE MEDIAN N/CARPAL TUNNEL SURG- 6/2007 Dr Sullivan, [facility]: L carpal tunnel release    OTHER SURGICAL HISTORY  07/09/2007    IN SLING OPER STRES INCONTINENCE- 7/9/07 Dr Chin: [facility] for stress incontinence    IN EXCISION OF GANGLION CYST FOREARM Right     wrist    SOFT TISSUE SURGERY  2007 and 2022    Carpal tunnel on both hands  and ganglion cyst on right hand    TOTAL HIP ARTHROPLASTY Left     [facility] TOTAL ABDOM HYSTERECTOMY  05/01/1999    - 5/1999: ANNALEE for fibroids, benign, by Dr. Mitchell ([facility]). Cervix taken. Does NOT need paps (per Dionisio 2/2010)     Labs reviewed in EPIC  Current providers sharing in care for this patient include:  Patient Care Team:  Myriam Johnson APRN CNP as PCP - General  "(Family Medicine)  Myriam Johnson, MATT CNP as Assigned PCP    The following health maintenance items are reviewed in Epic and correct as of today:  Health Maintenance   Topic Date Due    COVID-19 VACCINE (8 - 2024-25 season) 03/04/2025    ANNUAL REVIEW OF HM ORDERS  06/14/2025    MEDICARE ANNUAL WELLNESS VISIT  06/14/2025    LIPID  09/09/2025    FALL RISK ASSESSMENT  06/16/2026    COLORECTAL CANCER SCREENING  04/13/2027    MAMMO SCREENING  06/11/2027    DIABETES SCREENING  09/09/2027    DEXA  11/07/2027    ADVANCE CARE PLANNING  06/02/2028    DTAP/TDAP/TD VACCINE (3 - Td or Tdap) 03/22/2029    HEPATITIS C SCREENING  Completed    PHQ-2 (once per calendar year)  Completed    INFLUENZA VACCINE  Completed    PNEUMOCOCCAL VACCINE 50+ YEARS  Completed    ZOSTER VACCINE  Completed    RSV VACCINE  Completed    HPV VACCINE  Aged Out    MENINGITIS VACCINE  Aged Out         Review of Systems  Constitutional, HEENT, cardiovascular, pulmonary, gi and gu systems are negative, except as otherwise noted.     Objective    Exam  /73 (BP Location: Left arm, Patient Position: Sitting, Cuff Size: Adult Regular)   Pulse 72   Resp 16   Ht 1.626 m (5' 4\")   Wt 73.6 kg (162 lb 4.8 oz)   SpO2 97%   BMI 27.86 kg/m     Estimated body mass index is 27.86 kg/m  as calculated from the following:    Height as of this encounter: 1.626 m (5' 4\").    Weight as of this encounter: 73.6 kg (162 lb 4.8 oz).    Physical Exam  GENERAL: alert and no distress  EYES: Eyes grossly normal to inspection, PERRL and conjunctivae and sclerae normal  NECK: no adenopathy, no asymmetry, masses, or scars  RESP: lungs clear to auscultation - no rales, rhonchi or wheezes  CV: regular rate and rhythm, normal S1 S2, no S3 or S4, no murmur, click or rub, no peripheral edema  ABDOMEN: soft, nontender, no hepatosplenomegaly, no masses and bowel sounds normal  MS: no gross musculoskeletal defects noted, no edema  SKIN: no suspicious lesions or rashes  NEURO: " Normal strength and tone, mentation intact and speech normal  PSYCH: mentation appears normal, affect normal/bright        6/16/2025   Mini Cog   Clock Draw Score 2 Normal   3 Item Recall 3 objects recalled   Mini Cog Total Score 5              Signed Electronically by: MATT Mistry CNP

## 2025-06-16 NOTE — PATIENT INSTRUCTIONS
"  Consider keeping a food diary (i.e. My Fitness Pal, Lose It, or other food tracker). Try to use it for 24 hours, every day for at least one week. This will help with accountability and assess what foods you are eating and their potential calorie/fat/sugar content.  Start Weighing yourself every day, this will aide in keeping you accountable. If it makes you anxious, you can skip this step.      Nutrition Goals  1) Follow www.Travolver.com to assess calorie consumption total per day for your body    2) use the 9\" Plate method (1/2 plate non-starchy vegetables/fruit, 1/4 plate lean protein, 1/4 plate whole grain starch - no more than 1/2 cup carb/meal).     3).Eat 3 meals a day (not 2, not 5) Chew your food well/SLOW down!    4) Eat your protein first, this will aide in filling you up. Eat Wheat, not white (bread, pasta, crackers, lavonne, bagels, tortillas, rice)  ---Can aim for 80 grams of lean protein daily should be effective for you and allow you to have good control over appetite if you start getting a protein rich meal with 25-30 grams of protein every 4.5-6 hours through the day. This protein anchor to the meal should be bulked up with some good vegetables/greens for the fiber/crunch/chew and benefit on your blood pressure.  Get the protein portion of your meal at the beginning of the meal as your appetite, especially if you are on appetite suppressant/diabetes medication.   **If your on an Appetite suppressants such as a GLP-1, this med can cause fullness, so make sure you get at least 25 grams of protein at each of your 3 meals daily to support good metabolism and lean muscle mass.      5). Be a water drinker/Minize liquid calories (no regular pop, no juice) skim or 1% milk OK  6) Sleep 7-8 hours each night. Address sleep if problematic  7) Stress management is important. Address if problematic  8) Limit restaurant, cafeteria, take out, drive through to 2 times per week or less  9) Minimize caffeine, " alcohol, and night-time snacking    -Follow up with the dietitian, or even programs such as weight watchers, Noom, etc.    **Some lean proteins: chicken, turkey, tuna, salmon, crab, fish, shrimp, scallops, lobster, lean cuts of beef and pork, luncheon meats, veggie burgers, beans (black, lima, garbanzo, dorman, kidney, refried), chile, cottage cheese, string cheese, other cheese, eggs, tofu, peanut butter, nuts, vegan crumbles, low sugar Greek yogurt      --- Continue physical activity; can start with 10 minutes per day like going for a walk after dinner or at your lunchtime. Goal is 8000 steps daily.   -For the Muscle: maintain your muscle mass (strength training at least 2X/wk)    Meal prep tips: https://www.Posmetrics/healthyeats/healthy-tips/2019/10/meal-prep-tips-hacks-experts    Avoid snacking as able. If snack is needed use lean protein and/or fruit/vegetable. Examples:              - 2 cup popcorn              - 1 cup mixed berries              - 15 almonds, walnuts, cashews              - carrot/celery sticks and 2 tbsp low-fat ranch              - 1 hard boiled egg              - Part-skim mozzarella cheese stick              - Low-fat, low-sugar greek yogurt with 1/2 cup berries              - Med apple or pear              - sliced bell peppers with 1/2 cup salsa              - 1/2 cup roasted chickpeas              - sliced cucumbers with vinegar     100 calorie sweets: Smart Sweets, Fiber One desserts, Fit and Active 100 calorie snack sweets at [employer]; [employer] 100 calorie pre-portioned cookies, sugar-free pudding, sugar-free jello.     Snack Recipes:  - Banana and creamy PB dip (https://www.diabetesfoodhub.org/recipes/sweet-peanut-buttery-dip.html?home-category_id=23)  - Roasted chickpeas (https://www.diabetesfoodhub.org/recipes/roasted-and-spiced-chickpeas.html?home-category_id=23)  - Lemon Raspberry cholo seed pudding (https://www.diabetesfoodhub.org/recipes/lemon-raspberry-  "cholo-seed-pudding.html?home-category_id=23)  - Black bean hummus with carrot and celery sticks (https://www.diabetesfoodhub.org/recipes/black-bean-hummus.html?home-category_id=23)  - Greek yogurt chocolate mouse (https://www.diabetesfoodhub.org/recipes/greek-yogurt-chocolate-mousse.html?home-category_id=23)   - Broccoli Cheese Bites  (https://www.diabetesfoodWest World Mediab.org/recipes/broccoli-cheese-bites.html?home-category_id=20)  - Chicken Satay with peanut sauce  (https://www.diabetesfoodWest World Mediab.org/recipes/blueberry-almond-chicken-salad-lettuce-wraps.html?home-category_id=20)  - Deviled Eggs  (https://www.diabetesfoodWest World Mediab.org/recipes/devilled-eggs.html?home-category_id=20)     Healthy Recipe Resources:  Books:  \"The Volumetrics Eating Plan\" by Danae Johnson, Ph.D.  \"Cooking that Counts\" by editors of [employer]  \"Calorie Smart Meals\" cookbook by [employer] and Neurolinks (200-500 calorie meals)     Websites:  www.GoodChime!  www.RxResults.org  https://www.diabetesfoodhub.org/all-recipes.html  https://www.[employer] Inc.MusicPlay Analytics/  Https://www.choosemyplate.gov/myplatekitchen  https://snaped.fns.usda.gov/recipes-menus   https://www.CureLauncher.Music[employer] Analytics/gallery/1665169/dietitian-budget-high-protein-dinner-recipes/  https://www.PhatNoise.MusicPlay Analytics/recipes/84/healthy-recipes/         Cultural Cuisines:  https://www.CureLauncher.MusicPlay Analytics/recipes/08404/cuisines-regions//  https://www.[employer].org/knowledge-center/recipes/  https://dorastable.com/recipe-index/     Apps:  Perfect Memory pratik (or website, mealime.com)   Nicholas         The Plate Method  Http://www.Friend Trusted/057358.pdf     Protein Sources   http://Friend Trusted/295753.pdf      Carbohydrates  http://fvfiles.com/936116.pdf      Mindful Eating  http://Friend Trusted/244844.pdf      Summary of Volumetrics Eating Plan  http://fvfiles.com/517863.pdf     "